# Patient Record
Sex: FEMALE | Race: WHITE | Employment: FULL TIME | URBAN - METROPOLITAN AREA
[De-identification: names, ages, dates, MRNs, and addresses within clinical notes are randomized per-mention and may not be internally consistent; named-entity substitution may affect disease eponyms.]

---

## 2017-09-22 ENCOUNTER — HOSPITAL ENCOUNTER (OUTPATIENT)
Dept: VASCULAR LAB | Age: 57
Discharge: HOME | End: 2017-09-22
Attending: INTERNAL MEDICINE | Admitting: INTERNAL MEDICINE

## 2017-09-22 DIAGNOSIS — M79.662 PAIN OF LEFT LOWER LEG: ICD-10-CM

## 2017-10-25 ENCOUNTER — HOSPITAL ENCOUNTER (OUTPATIENT)
Dept: MAMMOGRAPHY | Age: 57
Discharge: HOME | End: 2017-10-25
Attending: OBSTETRICS & GYNECOLOGY | Admitting: OBSTETRICS & GYNECOLOGY

## 2017-10-25 DIAGNOSIS — Z12.31 VISIT FOR SCREENING MAMMOGRAM: ICD-10-CM

## 2018-12-28 ENCOUNTER — HOSPITAL ENCOUNTER (OUTPATIENT)
Dept: MAMMOGRAPHY | Age: 58
Discharge: HOME | End: 2018-12-28
Payer: COMMERCIAL

## 2018-12-28 DIAGNOSIS — Z12.31 VISIT FOR SCREENING MAMMOGRAM: ICD-10-CM

## 2018-12-28 PROCEDURE — 77063 BREAST TOMOSYNTHESIS BI: CPT

## 2020-06-05 ENCOUNTER — HOSPITAL ENCOUNTER (OUTPATIENT)
Dept: MAMMOGRAPHY | Age: 60
Discharge: HOME | End: 2020-06-05
Payer: COMMERCIAL

## 2020-06-05 PROCEDURE — 77063 BREAST TOMOSYNTHESIS BI: CPT

## 2021-02-05 ENCOUNTER — APPOINTMENT (OUTPATIENT)
Dept: CT IMAGING | Age: 61
DRG: 176 | End: 2021-02-05
Payer: COMMERCIAL

## 2021-02-05 ENCOUNTER — APPOINTMENT (OUTPATIENT)
Dept: GENERAL RADIOLOGY | Age: 61
DRG: 176 | End: 2021-02-05
Payer: COMMERCIAL

## 2021-02-05 ENCOUNTER — HOSPITAL ENCOUNTER (INPATIENT)
Age: 61
LOS: 3 days | Discharge: HOME | DRG: 176 | End: 2021-02-08
Attending: EMERGENCY MEDICINE | Admitting: INTERNAL MEDICINE
Payer: COMMERCIAL

## 2021-02-05 DIAGNOSIS — I26.99 OTHER ACUTE PULMONARY EMBOLISM WITHOUT ACUTE COR PULMONALE (HCC): Primary | ICD-10-CM

## 2021-02-05 LAB
ANION GAP SERPL CALCULATED.3IONS-SCNC: 12 MMOL/L (ref 3–16)
BASOPHILS ABSOLUTE: 0.1 K/UL (ref 0–0.2)
BASOPHILS RELATIVE PERCENT: 0.5 %
BUN BLDV-MCNC: 10 MG/DL (ref 7–20)
CALCIUM SERPL-MCNC: 9.3 MG/DL (ref 8.3–10.6)
CHLORIDE BLD-SCNC: 97 MMOL/L (ref 99–110)
CO2: 23 MMOL/L (ref 21–32)
CREAT SERPL-MCNC: <0.5 MG/DL (ref 0.6–1.2)
D DIMER: 981 NG/ML DDU (ref 0–229)
EKG ATRIAL RATE: 92 BPM
EKG DIAGNOSIS: NORMAL
EKG P AXIS: -1 DEGREES
EKG P-R INTERVAL: 134 MS
EKG Q-T INTERVAL: 366 MS
EKG QRS DURATION: 80 MS
EKG QTC CALCULATION (BAZETT): 452 MS
EKG R AXIS: 12 DEGREES
EKG T AXIS: 45 DEGREES
EKG VENTRICULAR RATE: 92 BPM
EOSINOPHILS ABSOLUTE: 0.3 K/UL (ref 0–0.6)
EOSINOPHILS RELATIVE PERCENT: 2.8 %
GFR AFRICAN AMERICAN: >60
GFR NON-AFRICAN AMERICAN: >60
GLUCOSE BLD-MCNC: 255 MG/DL (ref 70–99)
HCT VFR BLD CALC: 46.6 % (ref 36–48)
HEMOGLOBIN: 15.9 G/DL (ref 12–16)
LYMPHOCYTES ABSOLUTE: 2.1 K/UL (ref 1–5.1)
LYMPHOCYTES RELATIVE PERCENT: 20.5 %
MCH RBC QN AUTO: 30.2 PG (ref 26–34)
MCHC RBC AUTO-ENTMCNC: 34.1 G/DL (ref 31–36)
MCV RBC AUTO: 88.4 FL (ref 80–100)
MONOCYTES ABSOLUTE: 0.6 K/UL (ref 0–1.3)
MONOCYTES RELATIVE PERCENT: 5.9 %
NEUTROPHILS ABSOLUTE: 7.3 K/UL (ref 1.7–7.7)
NEUTROPHILS RELATIVE PERCENT: 70.3 %
PDW BLD-RTO: 13.4 % (ref 12.4–15.4)
PLATELET # BLD: 230 K/UL (ref 135–450)
PMV BLD AUTO: 8.3 FL (ref 5–10.5)
POTASSIUM REFLEX MAGNESIUM: 3.7 MMOL/L (ref 3.5–5.1)
PRO-BNP: 74 PG/ML (ref 0–124)
RBC # BLD: 5.27 M/UL (ref 4–5.2)
SODIUM BLD-SCNC: 132 MMOL/L (ref 136–145)
TROPONIN: <0.01 NG/ML
WBC # BLD: 10.4 K/UL (ref 4–11)

## 2021-02-05 PROCEDURE — 85520 HEPARIN ASSAY: CPT

## 2021-02-05 PROCEDURE — 85025 COMPLETE CBC W/AUTO DIFF WBC: CPT

## 2021-02-05 PROCEDURE — 85379 FIBRIN DEGRADATION QUANT: CPT

## 2021-02-05 PROCEDURE — 93005 ELECTROCARDIOGRAM TRACING: CPT | Performed by: STUDENT IN AN ORGANIZED HEALTH CARE EDUCATION/TRAINING PROGRAM

## 2021-02-05 PROCEDURE — 1200000000 HC SEMI PRIVATE

## 2021-02-05 PROCEDURE — 6370000000 HC RX 637 (ALT 250 FOR IP): Performed by: STUDENT IN AN ORGANIZED HEALTH CARE EDUCATION/TRAINING PROGRAM

## 2021-02-05 PROCEDURE — 6360000002 HC RX W HCPCS: Performed by: STUDENT IN AN ORGANIZED HEALTH CARE EDUCATION/TRAINING PROGRAM

## 2021-02-05 PROCEDURE — 99285 EMERGENCY DEPT VISIT HI MDM: CPT

## 2021-02-05 PROCEDURE — 71045 X-RAY EXAM CHEST 1 VIEW: CPT

## 2021-02-05 PROCEDURE — 85730 THROMBOPLASTIN TIME PARTIAL: CPT

## 2021-02-05 PROCEDURE — 6360000004 HC RX CONTRAST MEDICATION: Performed by: STUDENT IN AN ORGANIZED HEALTH CARE EDUCATION/TRAINING PROGRAM

## 2021-02-05 PROCEDURE — 96375 TX/PRO/DX INJ NEW DRUG ADDON: CPT

## 2021-02-05 PROCEDURE — 83880 ASSAY OF NATRIURETIC PEPTIDE: CPT

## 2021-02-05 PROCEDURE — 96374 THER/PROPH/DIAG INJ IV PUSH: CPT

## 2021-02-05 PROCEDURE — 71260 CT THORAX DX C+: CPT

## 2021-02-05 PROCEDURE — 80048 BASIC METABOLIC PNL TOTAL CA: CPT

## 2021-02-05 PROCEDURE — 84484 ASSAY OF TROPONIN QUANT: CPT

## 2021-02-05 PROCEDURE — 36415 COLL VENOUS BLD VENIPUNCTURE: CPT

## 2021-02-05 RX ORDER — INSULIN LISPRO 100 [IU]/ML
0-3 INJECTION, SOLUTION INTRAVENOUS; SUBCUTANEOUS NIGHTLY
Status: DISCONTINUED | OUTPATIENT
Start: 2021-02-05 | End: 2021-02-06

## 2021-02-05 RX ORDER — NICOTINE POLACRILEX 4 MG
15 LOZENGE BUCCAL PRN
Status: DISCONTINUED | OUTPATIENT
Start: 2021-02-05 | End: 2021-02-08 | Stop reason: HOSPADM

## 2021-02-05 RX ORDER — POLYETHYLENE GLYCOL 3350 17 G/17G
17 POWDER, FOR SOLUTION ORAL DAILY PRN
Status: DISCONTINUED | OUTPATIENT
Start: 2021-02-05 | End: 2021-02-08 | Stop reason: HOSPADM

## 2021-02-05 RX ORDER — ACETAMINOPHEN 650 MG/1
650 SUPPOSITORY RECTAL EVERY 6 HOURS PRN
Status: DISCONTINUED | OUTPATIENT
Start: 2021-02-05 | End: 2021-02-08 | Stop reason: HOSPADM

## 2021-02-05 RX ORDER — ACETAMINOPHEN 325 MG/1
650 TABLET ORAL EVERY 6 HOURS PRN
Status: DISCONTINUED | OUTPATIENT
Start: 2021-02-05 | End: 2021-02-08 | Stop reason: HOSPADM

## 2021-02-05 RX ORDER — DIPHENHYDRAMINE HYDROCHLORIDE 50 MG/ML
25 INJECTION INTRAMUSCULAR; INTRAVENOUS ONCE
Status: COMPLETED | OUTPATIENT
Start: 2021-02-05 | End: 2021-02-05

## 2021-02-05 RX ORDER — DULOXETIN HYDROCHLORIDE 30 MG/1
30 CAPSULE, DELAYED RELEASE ORAL DAILY
Status: DISCONTINUED | OUTPATIENT
Start: 2021-02-06 | End: 2021-02-08 | Stop reason: HOSPADM

## 2021-02-05 RX ORDER — HEPARIN SODIUM 1000 [USP'U]/ML
80 INJECTION, SOLUTION INTRAVENOUS; SUBCUTANEOUS PRN
Status: DISCONTINUED | OUTPATIENT
Start: 2021-02-05 | End: 2021-02-07

## 2021-02-05 RX ORDER — IBUPROFEN 400 MG/1
800 TABLET ORAL ONCE
Status: COMPLETED | OUTPATIENT
Start: 2021-02-05 | End: 2021-02-05

## 2021-02-05 RX ORDER — PROMETHAZINE HYDROCHLORIDE 12.5 MG/1
12.5 TABLET ORAL EVERY 6 HOURS PRN
Status: DISCONTINUED | OUTPATIENT
Start: 2021-02-05 | End: 2021-02-08 | Stop reason: HOSPADM

## 2021-02-05 RX ORDER — DEXTROSE MONOHYDRATE 25 G/50ML
12.5 INJECTION, SOLUTION INTRAVENOUS PRN
Status: DISCONTINUED | OUTPATIENT
Start: 2021-02-05 | End: 2021-02-08 | Stop reason: HOSPADM

## 2021-02-05 RX ORDER — SODIUM CHLORIDE 0.9 % (FLUSH) 0.9 %
10 SYRINGE (ML) INJECTION PRN
Status: DISCONTINUED | OUTPATIENT
Start: 2021-02-05 | End: 2021-02-08 | Stop reason: HOSPADM

## 2021-02-05 RX ORDER — HEPARIN SODIUM 10000 [USP'U]/100ML
12 INJECTION, SOLUTION INTRAVENOUS CONTINUOUS
Status: DISCONTINUED | OUTPATIENT
Start: 2021-02-05 | End: 2021-02-07

## 2021-02-05 RX ORDER — ONDANSETRON 2 MG/ML
4 INJECTION INTRAMUSCULAR; INTRAVENOUS EVERY 6 HOURS PRN
Status: DISCONTINUED | OUTPATIENT
Start: 2021-02-05 | End: 2021-02-08 | Stop reason: HOSPADM

## 2021-02-05 RX ORDER — SODIUM CHLORIDE 0.9 % (FLUSH) 0.9 %
10 SYRINGE (ML) INJECTION EVERY 12 HOURS SCHEDULED
Status: DISCONTINUED | OUTPATIENT
Start: 2021-02-05 | End: 2021-02-08 | Stop reason: HOSPADM

## 2021-02-05 RX ORDER — INSULIN LISPRO 100 [IU]/ML
0-6 INJECTION, SOLUTION INTRAVENOUS; SUBCUTANEOUS
Status: DISCONTINUED | OUTPATIENT
Start: 2021-02-06 | End: 2021-02-06

## 2021-02-05 RX ORDER — DIAZEPAM 5 MG/1
5 TABLET ORAL ONCE
Status: COMPLETED | OUTPATIENT
Start: 2021-02-05 | End: 2021-02-05

## 2021-02-05 RX ORDER — HEPARIN SODIUM 1000 [USP'U]/ML
40 INJECTION, SOLUTION INTRAVENOUS; SUBCUTANEOUS PRN
Status: DISCONTINUED | OUTPATIENT
Start: 2021-02-05 | End: 2021-02-07

## 2021-02-05 RX ORDER — METHYLPREDNISOLONE SODIUM SUCCINATE 125 MG/2ML
60 INJECTION, POWDER, LYOPHILIZED, FOR SOLUTION INTRAMUSCULAR; INTRAVENOUS ONCE
Status: COMPLETED | OUTPATIENT
Start: 2021-02-05 | End: 2021-02-05

## 2021-02-05 RX ORDER — HEPARIN SODIUM 1000 [USP'U]/ML
80 INJECTION, SOLUTION INTRAVENOUS; SUBCUTANEOUS ONCE
Status: COMPLETED | OUTPATIENT
Start: 2021-02-05 | End: 2021-02-05

## 2021-02-05 RX ORDER — DEXTROSE MONOHYDRATE 50 MG/ML
100 INJECTION, SOLUTION INTRAVENOUS PRN
Status: DISCONTINUED | OUTPATIENT
Start: 2021-02-05 | End: 2021-02-08 | Stop reason: HOSPADM

## 2021-02-05 RX ADMIN — IBUPROFEN 800 MG: 400 TABLET, FILM COATED ORAL at 18:16

## 2021-02-05 RX ADMIN — DIPHENHYDRAMINE HYDROCHLORIDE 25 MG: 50 INJECTION INTRAMUSCULAR; INTRAVENOUS at 18:52

## 2021-02-05 RX ADMIN — HEPARIN SODIUM 18 UNITS/KG/HR: 10000 INJECTION, SOLUTION INTRAVENOUS at 21:09

## 2021-02-05 RX ADMIN — METHYLPREDNISOLONE SODIUM SUCCINATE 60 MG: 125 INJECTION, POWDER, FOR SOLUTION INTRAMUSCULAR; INTRAVENOUS at 18:16

## 2021-02-05 RX ADMIN — DIAZEPAM 5 MG: 5 TABLET ORAL at 18:16

## 2021-02-05 RX ADMIN — HEPARIN SODIUM 8530 UNITS: 1000 INJECTION INTRAVENOUS; SUBCUTANEOUS at 21:08

## 2021-02-05 RX ADMIN — IOPAMIDOL 80 ML: 755 INJECTION, SOLUTION INTRAVENOUS at 19:51

## 2021-02-05 ASSESSMENT — ENCOUNTER SYMPTOMS
ABDOMINAL PAIN: 0
CHEST TIGHTNESS: 1
CONSTIPATION: 0
ALLERGIC/IMMUNOLOGIC NEGATIVE: 1
SHORTNESS OF BREATH: 1
EYES NEGATIVE: 1
VOMITING: 0
DIARRHEA: 0
NAUSEA: 0
COUGH: 0
GASTROINTESTINAL NEGATIVE: 1
WHEEZING: 0

## 2021-02-05 ASSESSMENT — PAIN DESCRIPTION - LOCATION: LOCATION: LEG

## 2021-02-05 ASSESSMENT — PAIN SCALES - GENERAL: PAINLEVEL_OUTOF10: 5

## 2021-02-05 ASSESSMENT — PAIN DESCRIPTION - ORIENTATION: ORIENTATION: RIGHT;LOWER

## 2021-02-05 NOTE — DISCHARGE INSTR - COC
Continuity of Care Form    Patient Name: Lana Marino   :  1960  MRN:  [id number]    Admit date:  2021  Discharge date:  ***    Code Status Order: No Order   Advance Directives:     Admitting Physician:  No admitting provider for patient encounter. PCP: Chadd Kunz    Discharging Nurse: [facility] Unit/Room#: D09/F22-21  Discharging Unit Phone Number: ***    Emergency Contact:   Extended Emergency Contact Information  Primary Emergency Contact: Josi Schneider  Address: [address] [place] [address], [address] [place] [address] Phone: [phone]  Work Phone: [phone]  Relation: Spouse  Secondary Emergency Contact: Josi Schneider  Address: [address] [place] [address], [address] [place] [address] Phone: [phone]  Work Phone: [phone]  Relation: Spouse    Past Surgical History:  History reviewed. No pertinent surgical history. Immunization History: There is no immunization history on file for this patient. Active Problems: There is no problem list on file for this patient. Isolation/Infection:   Isolation          No Isolation        Unreconciled Outside Infections     Enable clinical decision support by reconciling outside information with the patient's chart.     .    Infection Onset Last Indicated Last Received Source    COVID-19 [facility]      Patient Infection Status     None to display          Nurse Assessment:  Last Vital Signs: BP (!) 138/99   Pulse 92   Temp 98 °F (36.7 °C) (Oral)   Resp 20   Ht 5' 9\" (1.753 m)   Wt 235 lb (106.6 kg)   SpO2 98%   BMI 34.70 kg/m²     Last documented pain score (0-10 scale): Pain Level: 5  Last Weight:   Wt Readings from Last 1 Encounters:   21 235 lb (106.6 kg)     Mental Status:  {IP PT MENTAL STATUS:}    IV Access:  { MARGARET IV ACCESS:922330957}    Nursing Mobility/ADLs:  Walking   {P DME GFHY:308113370}  Transfer  {P DME NPNZ:552753747}  Bathing  {CHP DME FEAI:237287093}  Dressing  {CHP DME SZAT:844877907}  Toileting  {CHP DME LKQK:403997949}  Feeding  {CHP DME ECMX:987627924}  Med Admin  {CHP DME VOUT:010212512}  Med Delivery   { MARGARET MED Delivery:617920967}    Wound Care Documentation and Therapy:        Elimination:  Continence:   · Bowel: {YES / WV:90495}  · Bladder: {YES / DO:43430}  Urinary Catheter: {Urinary Catheter:276437621}   Colostomy/Ileostomy/Ileal Conduit: {YES / VS:75533}       Date of Last BM: ***  No intake or output data in the 24 hours ending 21 1705  No intake/output data recorded.     Safety Concerns:     [address] [employer] Safety Concerns:045112930}    Impairments/Disabilities:      [address] [employer] Impairments/Disabilities:324879356}    Nutrition Therapy:  Current Nutrition Therapy:   [address] [employer] Diet List:945699271}    Routes of Feeding: {CHP DME Other Feedings:267452745}  Liquids: {Slp liquid thickness:39327}  Daily Fluid Restriction: {CHP DME Yes amt example:732478452}  Last Modified Barium Swallow with Video (Video Swallowing Test): {Done Not Done [facility]:650709025}    Treatments at the Time of Hospital Discharge:   Respiratory Treatments: ***  Oxygen Therapy:  {Therapy; copd oxygen:27000}  Ventilator:    { CC Vent YBVO:826708019}    Rehab Therapies: {THERAPEUTIC INTERVENTION:0722784899}  Weight Bearing Status/Restrictions: [address] [employer] Weight Bearin}  Other Medical Equipment (for information only, NOT a DME order):  {EQUIPMENT:011212152}  Other Treatments: ***    Patient's personal belongings (please select all that are sent with patient):  {CHP DME Belongings:132914531}    RN SIGNATURE:  {Esignature:110816592}    CASE MANAGEMENT/SOCIAL WORK SECTION    Inpatient Status Date: ***    Readmission Risk Assessment Score:  Readmission Risk              Risk of Unplanned Readmission:        0           Discharging to Facility/ Agency   · Name:   · Address:  · Phone:  · Fax:    Dialysis Facility (if applicable) · Name:  · Address:  · Dialysis Schedule:  · Phone:  · Fax:    / signature: {Esignature:410356099}    PHYSICIAN SECTION    Prognosis: {Prognosis:8872624858}    Condition at Discharge: Brent Carvalho Patient Condition:634518426}    Rehab Potential (if transferring to Rehab): {Prognosis:2809535893}    Recommended Labs or Other Treatments After Discharge: ***    Physician Certification: I certify the above information and transfer of Myla Olivas  is necessary for the continuing treatment of the diagnosis listed and that she requires {Admit to Appropriate Level of Care:04067} for {GREATER/LESS:798542574} 30 days.      Update Admission H&P: {CHP DME Changes in XSGOS:171412029}    PHYSICIAN SIGNATURE:  {Esignature:839919249}

## 2021-02-05 NOTE — ED TRIAGE NOTES
Pt arrived to ED with pain in right lower leg for 3 days and worsening shortness of breath for the past couple days. PCP concerned for blood clot. Recent COVID 1/9/2021.

## 2021-02-05 NOTE — ED PROVIDER NOTES
1 [facility]  EMERGENCY DEPARTMENT ENCOUNTER          [facility] RESIDENT NOTE       Date of evaluation: 2/5/2021    Chief Complaint     Leg Pain (recent COVID 1/9/2021. rule out blood clot) and Shortness of Breath      History of Present Illness     Loki Damon is a 63 y.o. female with past medical history of psoriatic arthritis, hypertension, fibromyalgia, previous Covid at the beginning of January, low IgG, who presents with pain of the right calf for 3 to 4 days. Reports having some pain in the right calf behind the knee that radiates up to her hip on the right side. It is intermittent in nature and goes anywhere from a 2 out of 10 to a 9 out of 10. It is a sharp pain and especially painful behind her knee. She reports swelling on her right calf. Patient is not on any blood thinners. Of note patient has also endorsed some shortness of breath fatigue and muscle aches over 3 to 4 days ago. She feels like these are residual Covid symptoms. She denies any chest pain and does not have any chest tenderness with touch. Patient is satting in the high 90s on room air. Review of Systems     Review of Systems   Constitutional: Positive for fatigue. HENT: Negative. Eyes: Negative. Respiratory: Positive for shortness of breath. Cardiovascular: Positive for leg swelling. Gastrointestinal: Negative. Endocrine: Negative. Genitourinary: Negative. Musculoskeletal: Positive for myalgias. Skin: Negative. Allergic/Immunologic: Negative. Neurological: Negative. Hematological: Negative. Psychiatric/Behavioral: Negative. Past Medical, Surgical, Family, and Social History     She has no past medical history on file. She has no past surgical history on file. Her family history is not on file. She reports that she has quit smoking. She has never used smokeless tobacco. She reports current alcohol use of about 14.0 standard drinks of alcohol per week.  She reports that she does not use drugs. Medications     Previous Medications    No medications on file       Allergies     She is allergic to latex; iodides; topiramate; morphine and related; penicillins; phenazopyridine; and sulfa antibiotics. Physical Exam     INITIAL VITALS: BP: (!) 138/99, Temp: 98 °F (36.7 °C), Pulse: 92, Resp: 20, SpO2: 98 %   Physical Exam  Constitutional:       Appearance: She is well-developed. HENT:      Head: Normocephalic and atraumatic. Eyes:      Pupils: Pupils are equal, round, and reactive to light. Neck:      Musculoskeletal: Normal range of motion. Cardiovascular:      Rate and Rhythm: Normal rate and regular rhythm. Pulmonary:      Effort: Pulmonary effort is normal.      Breath sounds: Normal breath sounds. Musculoskeletal: Normal range of motion. Neurological:      General: No focal deficit present. Mental Status: She is alert and oriented to person, place, and time. Diagnostic Results     EKG   Interpreted inconjunction with emergency department physician Lazaro Robles MD  Rhythm: normal sinus   Rate: normal  Axis: normal  Ectopy: none  Conduction: normal  ST Segments: no acute change  T Waves: no acute change  Q Waves: none  Clinical Impression: no acute changes  Comparison:      RADIOLOGY:  CT CHEST PULMONARY EMBOLISM W CONTRAST   Final Result      1. Bilateral pulmonary thromboembolus, in the branch vessels proximal mid and distal without signs of any pleural effusion, pulmonary infarct or right heart strain at this time. No parenchymal consolidation      2. No sign of any aortic aneurysm or dissection. Fatty liver and hepatic cystic change noted      Results called to the emergency room resident physician taking care of this patient at 2025 hours      XR CHEST PORTABLE   Final Result      1. No acute process or consolidation   2.  Stable                   LABS:   Results for orders placed or performed during the hospital encounter of 02/05/21   CBC Auto Differential   Result Value Ref Range    WBC 10.4 4.0 - 11.0 K/uL    RBC 5.27 (H) 4.00 - 5.20 M/uL    Hemoglobin 15.9 12.0 - 16.0 g/dL    Hematocrit 46.6 36.0 - 48.0 %    MCV 88.4 80.0 - 100.0 fL    MCH 30.2 26.0 - 34.0 pg    MCHC 34.1 31.0 - 36.0 g/dL    RDW 13.4 12.4 - 15.4 %    Platelets 860 803 - 471 K/uL    MPV 8.3 5.0 - 10.5 fL    Neutrophils % 70.3 %    Lymphocytes % 20.5 %    Monocytes % 5.9 %    Eosinophils % 2.8 %    Basophils % 0.5 %    Neutrophils Absolute 7.3 1.7 - 7.7 K/uL    Lymphocytes Absolute 2.1 1.0 - 5.1 K/uL    Monocytes Absolute 0.6 0.0 - 1.3 K/uL    Eosinophils Absolute 0.3 0.0 - 0.6 K/uL    Basophils Absolute 0.1 0.0 - 0.2 K/uL   Troponin   Result Value Ref Range    Troponin <0.01 <0.01 ng/mL   Basic Metabolic Panel w/ Reflex to MG   Result Value Ref Range    Sodium 132 (L) 136 - 145 mmol/L    Potassium reflex Magnesium 3.7 3.5 - 5.1 mmol/L    Chloride 97 (L) 99 - 110 mmol/L    CO2 23 21 - 32 mmol/L    Anion Gap 12 3 - 16    Glucose 255 (H) 70 - 99 mg/dL    BUN 10 7 - 20 mg/dL    CREATININE <0.5 (L) 0.6 - 1.2 mg/dL    GFR Non-African American >60 >60    GFR African American >60 >60    Calcium 9.3 8.3 - 10.6 mg/dL   D-Dimer, Quantitative   Result Value Ref Range    D-Dimer, Quant 981 (H) 0 - 229 ng/mL DDU   Brain Natriuretic Peptide   Result Value Ref Range    Pro-BNP 74 0 - 124 pg/mL   EKG 12 Lead   Result Value Ref Range    Ventricular Rate 92 BPM    Atrial Rate 92 BPM    P-R Interval 134 ms    QRS Duration 80 ms    Q-T Interval 366 ms    QTc Calculation (Bazett) 452 ms    P Axis -1 degrees    R Axis 12 degrees    T Axis 45 degrees    Diagnosis       EKG performed in ER and to be interpreted by ER physician. Confirmed by MD, ER ([id number]),  Ishmael Last ([phone]) on 2/5/2021 7:28:23 PM       ED BEDSIDE ULTRASOUND:      RECENT VITALS:  BP: (!) 147/91, Temp: 98 °F (36.7 °C),Pulse: 87, Resp: 18, SpO2: 97 %     Procedures         ED Course     Nursing Notes, Past Medical Hx, Past Surgical Hx, Social Hx, Allergies, and FamilyHx were reviewed. The patient was giventhe following medications:  Orders Placed This Encounter   Medications    diazePAM (VALIUM) tablet 5 mg    ibuprofen (ADVIL;MOTRIN) tablet 800 mg    methylPREDNISolone sodium (SOLU-MEDROL) injection 60 mg    diphenhydrAMINE (BENADRYL) injection 25 mg    iopamidol (ISOVUE-370) 76 % injection 80 mL    heparin (porcine) injection 8,530 Units    heparin (porcine) injection 8,530 Units    heparin (porcine) injection 4,260 Units    heparin 25,000 units in dextrose 5% 250 mL (premix) infusion       CONSULTS:  IP CONSULT TO HOSPITALIST    MEDICAL DECISION MAKING / ASSESSMENT / Rafael Barriga is a 63 y.o. female with past medical history of psoriatic arthritis, hypertension, fibromyalgia, previous Covid at the beginning of January, low IgG, who presents with pain of the right calf for 3 to 4 days. We did a broad lab work-up. Including BNP, CBC, troponin, BNP, D-dimer. Troponin was negative BNP BNP and CBC are unremarkable. Patient's D-dimer resulted at 980 and with combined shortness of breath we felt it was necessary to get a CTPA to exclude a PE.  CTPA results of bilateral PEs with no visible R heart strain. Patient was started on heparin drip and hospitalist was consulted to admit the patient. Patient was discussed with hospitalist she is agreeable to admit patient to hospital.  Patient was informed and agreeable to plan. This patient was also evaluated by the attending physician. All care plans were discussed and agreed upon. Clinical Impression     1. Other acute pulmonary embolism without acute cor pulmonale (HCC)        Disposition     PATIENT REFERRED TO:  No follow-up provider specified.     DISCHARGE MEDICATIONS:  New Prescriptions    No medications on file       DISPOSITION Admitted 02/05/2021 09:04:52 PM     Nika Moralez MD  Resident  02/05/21 3388

## 2021-02-05 NOTE — ED PROVIDER NOTES
ED Attending Attestation Note     Date of evaluation: 2/5/2021    This patient was seen by the resident. I have seen and examined the patient, agree with the workup, evaluation, management and diagnosis. The care plan has been discussed. I have reviewed the ECG and concur with the resident's interpretation. My assessment reveals a 63year old female with a past medical history of HTN, fibromyalgia, CARDENAS and diabetes with COVDI-19 on 1/8/21 who presented to the ED with right calf pain. She has also been experiencing worsening shortness of breath. On my evaluation she is well appearing with a regular rate and rhythm and clear lungs. On my evaluation she is tender to palpation of her right calf. No erythema, warmth, or swelling. 5/5 PF/DF/EHL. SILT. +2 DP pulse. She has full range of motion of her right lower extremity.         Marya Osullivan MD  02/05/21 5724

## 2021-02-06 LAB
ANION GAP SERPL CALCULATED.3IONS-SCNC: 12 MMOL/L (ref 3–16)
ANTI-XA UNFRAC HEPARIN: 0.51 IU/ML (ref 0.3–0.7)
ANTI-XA UNFRAC HEPARIN: 1.09 IU/ML (ref 0.3–0.7)
ANTI-XA UNFRAC HEPARIN: >1.8 IU/ML (ref 0.3–0.7)
APTT: >248 SEC (ref 24.2–36.2)
BASOPHILS ABSOLUTE: 0 K/UL (ref 0–0.2)
BASOPHILS RELATIVE PERCENT: 0.3 %
BUN BLDV-MCNC: 15 MG/DL (ref 7–20)
CALCIUM SERPL-MCNC: 9.5 MG/DL (ref 8.3–10.6)
CHLORIDE BLD-SCNC: 103 MMOL/L (ref 99–110)
CO2: 23 MMOL/L (ref 21–32)
CREAT SERPL-MCNC: 0.6 MG/DL (ref 0.6–1.2)
EOSINOPHILS ABSOLUTE: 0 K/UL (ref 0–0.6)
EOSINOPHILS RELATIVE PERCENT: 0.1 %
GFR AFRICAN AMERICAN: >60
GFR NON-AFRICAN AMERICAN: >60
GLUCOSE BLD-MCNC: 255 MG/DL (ref 70–99)
GLUCOSE BLD-MCNC: 265 MG/DL (ref 70–99)
GLUCOSE BLD-MCNC: 289 MG/DL (ref 70–99)
GLUCOSE BLD-MCNC: 318 MG/DL (ref 70–99)
GLUCOSE BLD-MCNC: 322 MG/DL (ref 70–99)
GLUCOSE BLD-MCNC: 433 MG/DL (ref 70–99)
GLUCOSE BLD-MCNC: 468 MG/DL (ref 70–99)
HCT VFR BLD CALC: 45.1 % (ref 36–48)
HEMOGLOBIN: 15.5 G/DL (ref 12–16)
LYMPHOCYTES ABSOLUTE: 1.4 K/UL (ref 1–5.1)
LYMPHOCYTES RELATIVE PERCENT: 10.8 %
MCH RBC QN AUTO: 30 PG (ref 26–34)
MCHC RBC AUTO-ENTMCNC: 34.3 G/DL (ref 31–36)
MCV RBC AUTO: 87.4 FL (ref 80–100)
MONOCYTES ABSOLUTE: 0.5 K/UL (ref 0–1.3)
MONOCYTES RELATIVE PERCENT: 3.5 %
NEUTROPHILS ABSOLUTE: 11 K/UL (ref 1.7–7.7)
NEUTROPHILS RELATIVE PERCENT: 85.3 %
PDW BLD-RTO: 13 % (ref 12.4–15.4)
PERFORMED ON: ABNORMAL
PLATELET # BLD: 270 K/UL (ref 135–450)
PMV BLD AUTO: 8 FL (ref 5–10.5)
POTASSIUM REFLEX MAGNESIUM: 3.9 MMOL/L (ref 3.5–5.1)
RBC # BLD: 5.16 M/UL (ref 4–5.2)
SODIUM BLD-SCNC: 138 MMOL/L (ref 136–145)
WBC # BLD: 12.9 K/UL (ref 4–11)

## 2021-02-06 PROCEDURE — 2060000000 HC ICU INTERMEDIATE R&B

## 2021-02-06 PROCEDURE — 80048 BASIC METABOLIC PNL TOTAL CA: CPT

## 2021-02-06 PROCEDURE — 6360000002 HC RX W HCPCS: Performed by: STUDENT IN AN ORGANIZED HEALTH CARE EDUCATION/TRAINING PROGRAM

## 2021-02-06 PROCEDURE — 36415 COLL VENOUS BLD VENIPUNCTURE: CPT

## 2021-02-06 PROCEDURE — 6370000000 HC RX 637 (ALT 250 FOR IP): Performed by: STUDENT IN AN ORGANIZED HEALTH CARE EDUCATION/TRAINING PROGRAM

## 2021-02-06 PROCEDURE — 6370000000 HC RX 637 (ALT 250 FOR IP): Performed by: INTERNAL MEDICINE

## 2021-02-06 PROCEDURE — 85025 COMPLETE CBC W/AUTO DIFF WBC: CPT

## 2021-02-06 PROCEDURE — 85520 HEPARIN ASSAY: CPT

## 2021-02-06 PROCEDURE — 2580000003 HC RX 258: Performed by: STUDENT IN AN ORGANIZED HEALTH CARE EDUCATION/TRAINING PROGRAM

## 2021-02-06 RX ORDER — TRIAMTERENE AND HYDROCHLOROTHIAZIDE 37.5; 25 MG/1; MG/1
1 TABLET ORAL DAILY
COMMUNITY

## 2021-02-06 RX ORDER — LABETALOL HYDROCHLORIDE 5 MG/ML
10 INJECTION, SOLUTION INTRAVENOUS EVERY 6 HOURS PRN
Status: DISCONTINUED | OUTPATIENT
Start: 2021-02-06 | End: 2021-02-08 | Stop reason: HOSPADM

## 2021-02-06 RX ORDER — INSULIN LISPRO 100 [IU]/ML
0-12 INJECTION, SOLUTION INTRAVENOUS; SUBCUTANEOUS
Status: DISCONTINUED | OUTPATIENT
Start: 2021-02-06 | End: 2021-02-07

## 2021-02-06 RX ORDER — DULOXETIN HYDROCHLORIDE 30 MG/1
30 CAPSULE, DELAYED RELEASE ORAL DAILY
COMMUNITY
Start: 2020-03-26

## 2021-02-06 RX ORDER — ACYCLOVIR 800 MG/1
800 TABLET ORAL
Status: DISCONTINUED | OUTPATIENT
Start: 2021-02-06 | End: 2021-02-08 | Stop reason: HOSPADM

## 2021-02-06 RX ORDER — INSULIN LISPRO 100 [IU]/ML
0-6 INJECTION, SOLUTION INTRAVENOUS; SUBCUTANEOUS NIGHTLY
Status: DISCONTINUED | OUTPATIENT
Start: 2021-02-06 | End: 2021-02-07

## 2021-02-06 RX ORDER — INSULIN LISPRO 100 [IU]/ML
0-12 INJECTION, SOLUTION INTRAVENOUS; SUBCUTANEOUS
Status: DISCONTINUED | OUTPATIENT
Start: 2021-02-06 | End: 2021-02-06

## 2021-02-06 RX ADMIN — INSULIN LISPRO 12 UNITS: 100 INJECTION, SOLUTION INTRAVENOUS; SUBCUTANEOUS at 06:14

## 2021-02-06 RX ADMIN — ACETAMINOPHEN 650 MG: 325 TABLET ORAL at 17:50

## 2021-02-06 RX ADMIN — INSULIN GLARGINE 21 UNITS: 100 INJECTION, SOLUTION SUBCUTANEOUS at 21:22

## 2021-02-06 RX ADMIN — Medication 10 ML: at 10:12

## 2021-02-06 RX ADMIN — INSULIN LISPRO 6 UNITS: 100 INJECTION, SOLUTION INTRAVENOUS; SUBCUTANEOUS at 17:39

## 2021-02-06 RX ADMIN — DULOXETINE HYDROCHLORIDE 30 MG: 30 CAPSULE, DELAYED RELEASE ORAL at 09:59

## 2021-02-06 RX ADMIN — INSULIN LISPRO 4 UNITS: 100 INJECTION, SOLUTION INTRAVENOUS; SUBCUTANEOUS at 21:23

## 2021-02-06 RX ADMIN — INSULIN HUMAN 10 UNITS: 100 INJECTION, SOLUTION PARENTERAL at 03:15

## 2021-02-06 RX ADMIN — ACETAMINOPHEN 650 MG: 325 TABLET ORAL at 10:32

## 2021-02-06 RX ADMIN — ACYCLOVIR 800 MG: 800 TABLET ORAL at 17:39

## 2021-02-06 RX ADMIN — HEPARIN SODIUM 15 UNITS/KG/HR: 10000 INJECTION, SOLUTION INTRAVENOUS at 02:08

## 2021-02-06 RX ADMIN — ACETAMINOPHEN 650 MG: 325 TABLET ORAL at 04:30

## 2021-02-06 RX ADMIN — INSULIN LISPRO 6 UNITS: 100 INJECTION, SOLUTION INTRAVENOUS; SUBCUTANEOUS at 12:12

## 2021-02-06 ASSESSMENT — PAIN DESCRIPTION - ORIENTATION: ORIENTATION: RIGHT;LOWER

## 2021-02-06 ASSESSMENT — ENCOUNTER SYMPTOMS
COUGH: 0
NAUSEA: 0
BACK PAIN: 0
SHORTNESS OF BREATH: 1
CHEST TIGHTNESS: 0
ABDOMINAL PAIN: 0
WHEEZING: 0
DIARRHEA: 0
VOMITING: 0

## 2021-02-06 ASSESSMENT — PAIN DESCRIPTION - PROGRESSION: CLINICAL_PROGRESSION: NOT CHANGED

## 2021-02-06 ASSESSMENT — PAIN SCALES - GENERAL
PAINLEVEL_OUTOF10: 3
PAINLEVEL_OUTOF10: 0
PAINLEVEL_OUTOF10: 6
PAINLEVEL_OUTOF10: 0

## 2021-02-06 ASSESSMENT — PAIN DESCRIPTION - PAIN TYPE: TYPE: ACUTE PAIN

## 2021-02-06 ASSESSMENT — PAIN DESCRIPTION - DESCRIPTORS: DESCRIPTORS: ACHING

## 2021-02-06 NOTE — H&P
Internal Medicine  PGY 1  Progress Note     CC leg pain, dyspnea    History Obtained From:  Patient    Interval History:    RIZWAN    Pt seen at bedside this morning, states is feeling \"ok\". Pt endorses continuing SOB with wheezing at rest, 4 word dyspnea and R calf tingling/numbness and pain that radiates up to R thigh. Pt denies chest pain, hemoptysis, lightheadedness or dizziness. Pt was updated on plan for continuing treatment and questions were answered. ROS  Pt endorses: subjective fevers, chronic fatigue, intermittent diarrhea x1 month  Pt Denies: chills, night sweats, nausea, vomiting       HISTORY OF PRESENT ILLNESS:    62 y/o F with PMH of recent covid infection (positive 1/9/21), DMT2, HTN, psoriatic arthritis, HTN, IgG def, depression presents with R leg pain and some dyspnea of 1 day duration. Pt reports R mid/lower calf crampy pain that radiates to her knee and up the side of her thigh. Pain improved with tylenol at home. She also noted acute LE and pleuritic chest pain that started this morning. She denies any history of previous thromboembolisms. She has a sedentary desk job. Reports being up-to-date on her cancer screenings. Recently had covid-19 infection, tested positive on 1/9/21 for whch she did not require hospitalization nor was she hypoxic. Her symptoms of sore throat and myalgias resolved about 1 week ago. In the ED, VS stable. Labwork sign for elevated d-dimer. CTPA showed acute bilateral PE w/o R heart strain. Pt started on heparin gtt and admitted for further management. Past Medical History:        Diagnosis Date    Arthritis     Depression     Diabetes mellitus (HCC)     Hypertension     IgG deficiency (HCC)     IgG deficiency - receives transfusion at home every 28 days, pt due for transfusion on 2/11.     Psoriatic arthritis ([facility] [address]     Vitamin D deficiency       recent covid infection (positive 1/9/21), DMT2, HTN, psoriatic arthritis, HTN, IgG def, depression Past Surgical History:    History reviewed. No pertinent surgical history. Medications Priorto Admission:    Medications Prior to Admission: triamterene-hydroCHLOROthiazide (MAXZIDE-25) 37.5-25 MG per tablet, Take 1 tablet by mouth daily  Apremilast (OTEZLA PO), Take by mouth  immune globulin, human, (GAMMAPLEX) 20 GM/200ML SOLN solution, Infuse 30 g intravenously every 30 days Receives every 28 days  DULoxetine (CYMBALTA) 30 MG extended release capsule, Take 30 mg by mouth daily  vitamin D3 (CHOLECALCIFEROL) 125 MCG (5000 UT) TABS tablet, Take 1 tablet by mouth daily  linaGLIPtin-metFORMIN HCl (JENTADUETO PO), Take by mouth    Allergies:  Latex, Iodides, Topiramate, Morphine and related, Penicillins, Phenazopyridine, and Sulfa antibiotics    Social History:   · TOBACCO:   reports that she has quit smoking. Her smoking use included cigarettes. She has never used smokeless tobacco.  · ETOH:   reports current alcohol use of about 14.0 standard drinks of alcohol per week. · DRUGS : denies   · Patient currently lives with family    ·   Family History:   History reviewed. No pertinent family history. ROS: A 10 point review of systems was conducted, significant findings as noted in HPI. Physical Exam  Vitals signs reviewed. Constitutional:       General: She is not in acute distress. Appearance: Normal appearance. HENT:      Head: Normocephalic and atraumatic. Comments:       Right Ear: External ear normal.      Left Ear: External ear normal.      Nose: Nose normal.      Mouth/Throat:      Mouth: Mucous membranes are moist.   Eyes:      General: No scleral icterus. Extraocular Movements: Extraocular movements intact. Neck:      Musculoskeletal: Normal range of motion and neck supple. Cardiovascular:      Rate and Rhythm: Normal rate and regular rhythm. Pulses: Normal pulses. Heart sounds: Normal heart sounds. No murmur.    Pulmonary: Effort: Pulmonary effort is normal. No respiratory distress. Breath sounds: Normal breath sounds. No wheezing. Chest:      Chest wall: No tenderness. Abdominal:      General: Bowel sounds are normal. There is no distension. Palpations: Abdomen is soft. Tenderness: There is no abdominal tenderness. Musculoskeletal:         General: Tenderness (r calf) present. No swelling. Right lower leg: No edema. Left lower leg: No edema. Comments: Posterior R calf tender to palpation   Skin:     Findings: Lesion (scalp 2/2 psoriasis) present. No rash. Neurological:      General: No focal deficit present. Mental Status: She is oriented to person, place, and time. Psychiatric:         Mood and Affect: Mood normal.         Behavior: Behavior normal.         Thought Content: Thought content normal.       Physical exam:       Vitals:    02/06/21 0928   BP: (!) 152/88   Pulse: 93   Resp: 20   Temp: 97.6 °F (36.4 °C)   SpO2: 98%       DATA:    Labs:  CBC:   Recent Labs     02/05/21  1810 02/06/21  0819   WBC 10.4 12.9*   HGB 15.9 15.5   HCT 46.6 45.1    270       BMP:   Recent Labs     02/05/21  1810 02/06/21  0819   * 138   K 3.7 3.9   CL 97* 103   CO2 23 23   BUN 10 15   CREATININE <0.5* 0.6   GLUCOSE 255* 255*     LFT's: No results for input(s): AST, ALT, ALB, BILITOT, ALKPHOS in the last 72 hours. Troponin:   Recent Labs     02/05/21 1810   TROPONINI <0.01     BNP:No results for input(s): BNP in the last 72 hours. ABGs: No results for input(s): PHART, SLE7SRV, PO2ART in the last 72 hours. INR: No results for input(s): INR in the last 72 hours. U/A:No results for input(s): NITRITE, COLORU, PHUR, LABCAST, WBCUA, RBCUA, MUCUS, TRICHOMONAS, YEAST, BACTERIA, CLARITYU, SPECGRAV, LEUKOCYTESUR, UROBILINOGEN, BILIRUBINUR, BLOODU, GLUCOSEU, AMORPHOUS in the last 72 hours.     Invalid input(s): KETONESU    CT CHEST PULMONARY EMBOLISM W CONTRAST   Final Result Vit D def - takes supplements at home     Will discuss with attending physician Dr. Ramsey Strickland Status: Full code  FEN: Gen diet  PPX: heparin gtt  DISPO: [id number] [address] PGY-1  2/6/2021,  9:30 AM     Gaetano Rhodes, MS4 acted as scribe

## 2021-02-06 NOTE — ED NOTES
Food tray provided after patient request to eat and order okayed and verified by physician. Patient in high-fowlers with bedside table in place, assisted with food arrangement and opened all containers.       Trina Chapa RN  02/05/21 2122

## 2021-02-06 NOTE — PROGRESS NOTES
Progress Note    Admit Date: 2/5/2021  Day: 1  Diet: DIET GENERAL;    CC: Shortness of breath    Interval history: No acute events overnight. Patient seen at bedside this morning. Endorses shortness of breath, is able to hold a conversation but gets short of breath within 10-20 seconds. Denies chest pain, NVD, dysuria. Endorses R calf soreness. HPI: 62 y/o F with PMH of recent covid infection (positive 1/9/21), DMT2, HTN, psoriatic arthritis, HTN, IgG def, depression presented with R calf pain for 3-4 days, acute onset SOB on exertion. Elevated D dimer, CTA showed bilateral pulmonary emboli without R heart strain. Medications:     Scheduled Meds:   insulin lispro  0-6 Units Subcutaneous Nightly    insulin lispro  0-12 Units Subcutaneous TID WC    sodium chloride flush  10 mL Intravenous 2 times per day    DULoxetine  30 mg Oral Daily     Continuous Infusions:   heparin (PORCINE) Infusion 15.009 Units/kg/hr (02/06/21 0411)    dextrose       PRN Meds:labetalol, heparin (porcine), heparin (porcine), sodium chloride flush, promethazine **OR** ondansetron, polyethylene glycol, acetaminophen **OR** acetaminophen, glucose, dextrose, glucagon (rDNA), dextrose    Objective:   Vitals:   T-max:  Patient Vitals for the past 8 hrs:   BP Temp Temp src Pulse Resp SpO2   02/06/21 0500 127/86 97.5 °F (36.4 °C) Oral 105 20 95 %       Intake/Output Summary (Last 24 hours) at 2/6/2021 0855  Last data filed at 2/6/2021 0411  Gross per 24 hour   Intake 128.48 ml   Output --   Net 128.48 ml       Review of Systems   Constitutional: Negative for chills and fever. Respiratory: Positive for shortness of breath. Negative for cough, chest tightness and wheezing. Cardiovascular: Negative for chest pain, palpitations and leg swelling. Gastrointestinal: Negative for abdominal pain, diarrhea, nausea and vomiting. Genitourinary: Negative for dysuria. Neurological: Negative for dizziness, light-headedness and numbness. Physical Exam  Vitals signs reviewed. HENT:      Nose: Nose normal.      Mouth/Throat:      Mouth: Mucous membranes are moist.   Cardiovascular:      Rate and Rhythm: Normal rate and regular rhythm. Pulses: Normal pulses. Pulmonary:      Effort: Pulmonary effort is normal. No respiratory distress. Breath sounds: Normal breath sounds. Abdominal:      General: There is no distension. Palpations: There is no mass. Skin:     General: Skin is warm. Neurological:      General: No focal deficit present. Mental Status: She is alert and oriented to person, place, and time. LABS:    CBC:   Recent Labs     02/05/21 1810   WBC 10.4   HGB 15.9   HCT 46.6      MCV 88.4     Renal:    Recent Labs     02/05/21 1810   *   K 3.7   CL 97*   CO2 23   BUN 10   CREATININE <0.5*   GLUCOSE 255*   CALCIUM 9.3   ANIONGAP 12     Hepatic: No results for input(s): AST, ALT, BILITOT, BILIDIR, PROT, LABALBU, ALKPHOS in the last 72 hours. Troponin:   Recent Labs     02/05/21 1810   TROPONINI <0.01     BNP: No results for input(s): BNP in the last 72 hours. Lipids: No results for input(s): CHOL, HDL in the last 72 hours. Invalid input(s): LDLCALCU, TRIGLYCERIDE  ABGs:  No results for input(s): PHART, COO8VKR, PO2ART, FDW0XQF, BEART, THGBART, F0XPZTII, RVZ3RTS in the last 72 hours. INR: No results for input(s): INR in the last 72 hours. Lactate: No results for input(s): LACTATE in the last 72 hours. Cultures:  -----------------------------------------------------------------  RAD:   CT CHEST PULMONARY EMBOLISM W CONTRAST   Final Result      1. Bilateral pulmonary thromboembolus, in the branch vessels proximal mid and distal without signs of any pleural effusion, pulmonary infarct or right heart strain at this time. No parenchymal consolidation      2. No sign of any aortic aneurysm or dissection.  Fatty liver and hepatic cystic change noted      Results called to the emergency room resident physician taking care of this patient at 2025 hours      XR CHEST PORTABLE   Final Result      1. No acute process or consolidation   2. Stable                   Assessment/Plan:   64 y/o F with PMH of recent covid infection (positive 1/9/21), DMT2, IgG def, presented with R leg pain and dyspnea of 1 day duration. Found to have bilateral PE. Hemodynamically stable w/o R heart strain or hypoxia. Admitted for anticoagulation for bilateral PE and possible R leg DVT likely provoked by recent covid infection in setting of sedentary lifestyle. Acute bilateral pulmonary embolism likely 2/2 recent COVID infection  Dyspnea on exertion, pleuritic chest pain. COVID positive 1/9/2021 (not hospitalized). Sedentary desk job. 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 EKG NSR with biphasic P waves. CT imaging with bilateral PE without right heart strain  - heparin gtt  - PT/OT, encourage ambulation    R leg pain likely 2/2 possible DVT  - as above  - tylenol prn    Mild hyponatremia  - encourage po intake    DM 2- LDSSI  HTN - holding home maxide 25mg in setting of acute PEs  Psoriatic arthritis - ortesla 30 BID at home   IgG deficiency - receives transfusion at home every 28 days, pt due for transfusion on 2/11. Depression - cont Cymbalta 30mg daily   Vit D def - takes supplements at home     Code Status: full code  FEN: general diet  PPX: heparin gtt  DISPO: vera Mejia Patient, DO, PGY-1   02/06/21  8:55 AM    This patient has been staffed and discussed with Wily Oneill MD.         Patient seen and examined, plan of care discussed with residents. Agree with their assessment and plan with following addendum:    [facility] She reported primarily fatigue and URI type symptoms. Never had DVT/PE, but mother was diagnosed with pancreatic cancer after she had DVTs and she worries about the same. She is having some bloating, which also could be due to COVID.  Will check her renal function panel tomorrow and if normal- will get pancreatic protocol CT.      Basia Peters

## 2021-02-06 NOTE — H&P
tightness and shortness of breath. Negative for cough and wheezing. Cardiovascular: Positive for chest pain. Negative for palpitations and leg swelling. Gastrointestinal: Negative for abdominal pain, constipation, diarrhea, nausea and vomiting. Genitourinary: Negative for dysuria. Musculoskeletal: Positive for myalgias. Negative for back pain. Neurological: Negative for syncope, weakness and light-headedness. ROS: A 10 point review of systems was conducted, significant findings as noted in HPI. Physical Exam  Constitutional:       General: She is not in acute distress. Appearance: Normal appearance. HENT:      Head: Normocephalic and atraumatic. Cardiovascular:      Rate and Rhythm: Normal rate and regular rhythm. Pulses: Normal pulses. Heart sounds: Normal heart sounds. No murmur. Pulmonary:      Effort: Pulmonary effort is normal. No respiratory distress. Breath sounds: Normal breath sounds. No wheezing. Abdominal:      General: There is no distension. Palpations: Abdomen is soft. Tenderness: There is no abdominal tenderness. Musculoskeletal:         General: Tenderness (r calf) present. No swelling. Right lower leg: No edema. Left lower leg: No edema. Skin:     Findings: Lesion (scalp 2/2 psoriasis) present. No rash. Neurological:      General: No focal deficit present. Mental Status: She is oriented to person, place, and time. Physical exam:       Vitals:    02/05/21 2244   BP: (!) 159/87   Pulse: 109   Resp: 22   Temp: 97.8 °F (36.6 °C)   SpO2: 95%       DATA:    Labs:  CBC:   Recent Labs     02/05/21  1810   WBC 10.4   HGB 15.9   HCT 46.6          BMP:   Recent Labs     02/05/21  1810   *   K 3.7   CL 97*   CO2 23   BUN 10   CREATININE <0.5*   GLUCOSE 255*     LFT's: No results for input(s): AST, ALT, ALB, BILITOT, ALKPHOS in the last 72 hours.   Troponin:   Recent Labs     02/05/21  1810   TROPONINI <0.01 BNP:No results for input(s): BNP in the last 72 hours. ABGs: No results for input(s): PHART, ISL2QOL, PO2ART in the last 72 hours. INR: No results for input(s): INR in the last 72 hours. U/A:No results for input(s): NITRITE, COLORU, PHUR, LABCAST, WBCUA, RBCUA, MUCUS, TRICHOMONAS, YEAST, BACTERIA, CLARITYU, SPECGRAV, LEUKOCYTESUR, UROBILINOGEN, BILIRUBINUR, BLOODU, GLUCOSEU, AMORPHOUS in the last 72 hours. Invalid input(s): KETONESU    CT CHEST PULMONARY EMBOLISM W CONTRAST   Final Result      1. Bilateral pulmonary thromboembolus, in the branch vessels proximal mid and distal without signs of any pleural effusion, pulmonary infarct or right heart strain at this time. No parenchymal consolidation      2. No sign of any aortic aneurysm or dissection. Fatty liver and hepatic cystic change noted      Results called to the emergency room resident physician taking care of this patient at 2025 hours      XR CHEST PORTABLE   Final Result      1. No acute process or consolidation   2. Stable                       ASSESSMENT AND PLAN:    64 y/o F with PMH of recent covid infection (positive 1/9/21), DMT2, IgG def, presented with R leg pain and dyspnea of 1 day duration. Found to have bilateral PE. Hemodynamically stable w/o R heart strain or hypoxia. Admitted for anticoagulation for bilateral PE and possible R leg DVT likely provoked by recent covid infection in setting of sedentary lifestyle. Acute bilateral pulmonary embolism - likely 2/2 recent covid-19 infection   LE, pleuritic chest pain. Recent covid infection (tested positive 1/9/21, was not hypoxic or hospitalized), sedentary/desk job. Hemodynamically stable. D-dimer 981. Normal trop and pro-BNP. EKG w/ NSR, biphasic P wave. CXR w/o acute disease. CT imaging w/ bilateral PE w/o R heart strain.    - currently not hypoxic, monitor for oxygen req  - recent c-scope clear, pt reports normal screening mammograms, no previous CVT  - heparin gtt for now, monitor antiXa  - PT/OT, encourage early ambulation     R leg pain likely 2/2 possible DVT   - treatment as above   - tylenol     Mild hyponatremia - 2/2 dehydration   - encourage PO intake     DMT2  Pt on jentadueto at home. - LDSS, glu checks     HTN - holding home maxide 25mg in setting of acute PEs  Psoriatic arthritis - takes ortesla 30 BID at home   IgG deficiency - receives transfusion at home every 28 days, pt due for transfusion on 2/11.   Depression - cont Cymbalta 30mg daily   Vit D def - takes supplements at home     Will discuss with attending physician Dr. Guille Stone     Code Status: Full code  FEN: Gen diet  PPX: heparin gtt  DISPO: Charles Puryd MD PGY-1  2/5/2021,  11:46 PM

## 2021-02-06 NOTE — PROGRESS NOTES
At 2303- This RN placed call to lab to inform that Anti-XA had not been drawn at 2033 time while pt was in ER. Pt arrived to 6S with heparin gtt running at 18 units/kg/hr. 8851- Lab called to inform this RN of APTT 248. Resident informed via [employer] that lab was waiting on new order to draw Anti-XA. Heparin gtt paused at 0100.  0131- Lab called to inform that Anti-XA result greater that 1.80. Per heparin gtt protocol, heparin to be paused, which was already done because of APTT lab. Per synopsis report, Anti-XA drawn at [phone]. Labs not available through results in charting for APTT and Anti-XA at this time.

## 2021-02-07 LAB
ANION GAP SERPL CALCULATED.3IONS-SCNC: 8 MMOL/L (ref 3–16)
ANTI-XA UNFRAC HEPARIN: 0.44 IU/ML (ref 0.3–0.7)
ANTI-XA UNFRAC HEPARIN: 0.45 IU/ML (ref 0.3–0.7)
APTT: 58.7 SEC (ref 24.2–36.2)
BASOPHILS ABSOLUTE: 0 K/UL (ref 0–0.2)
BASOPHILS RELATIVE PERCENT: 0 %
BUN BLDV-MCNC: 13 MG/DL (ref 7–20)
CALCIUM SERPL-MCNC: 8.5 MG/DL (ref 8.3–10.6)
CHLORIDE BLD-SCNC: 101 MMOL/L (ref 99–110)
CO2: 27 MMOL/L (ref 21–32)
CREAT SERPL-MCNC: <0.5 MG/DL (ref 0.6–1.2)
EOSINOPHILS ABSOLUTE: 0.2 K/UL (ref 0–0.6)
EOSINOPHILS RELATIVE PERCENT: 2 %
GFR AFRICAN AMERICAN: >60
GFR NON-AFRICAN AMERICAN: >60
GLUCOSE BLD-MCNC: 193 MG/DL (ref 70–99)
GLUCOSE BLD-MCNC: 241 MG/DL (ref 70–99)
GLUCOSE BLD-MCNC: 243 MG/DL (ref 70–99)
GLUCOSE BLD-MCNC: 252 MG/DL (ref 70–99)
GLUCOSE BLD-MCNC: 265 MG/DL (ref 70–99)
HCT VFR BLD CALC: 40.6 % (ref 36–48)
HCT VFR BLD CALC: 43.6 % (ref 36–48)
HEMOGLOBIN: 14.5 G/DL (ref 12–16)
HEMOGLOBIN: 15.4 G/DL (ref 12–16)
INR BLD: 1.18 (ref 0.86–1.14)
LYMPHOCYTES ABSOLUTE: 2.4 K/UL (ref 1–5.1)
LYMPHOCYTES RELATIVE PERCENT: 29 %
MCH RBC QN AUTO: 30.5 PG (ref 26–34)
MCH RBC QN AUTO: 32 PG (ref 26–34)
MCHC RBC AUTO-ENTMCNC: 35.3 G/DL (ref 31–36)
MCHC RBC AUTO-ENTMCNC: 35.6 G/DL (ref 31–36)
MCV RBC AUTO: 85.5 FL (ref 80–100)
MCV RBC AUTO: 90.6 FL (ref 80–100)
MONOCYTES ABSOLUTE: 0.7 K/UL (ref 0–1.3)
MONOCYTES RELATIVE PERCENT: 9 %
NEUTROPHILS ABSOLUTE: 4.9 K/UL (ref 1.7–7.7)
NEUTROPHILS RELATIVE PERCENT: 60 %
PDW BLD-RTO: 13.4 % (ref 12.4–15.4)
PDW BLD-RTO: 13.5 % (ref 12.4–15.4)
PERFORMED ON: ABNORMAL
PLATELET # BLD: 211 K/UL (ref 135–450)
PLATELET # BLD: 256 K/UL (ref 135–450)
PMV BLD AUTO: 7.6 FL (ref 5–10.5)
PMV BLD AUTO: 8.1 FL (ref 5–10.5)
POTASSIUM REFLEX MAGNESIUM: 3.8 MMOL/L (ref 3.5–5.1)
PROTHROMBIN TIME: 13.7 SEC (ref 10–13.2)
RBC # BLD: 4.75 M/UL (ref 4–5.2)
RBC # BLD: 4.81 M/UL (ref 4–5.2)
RBC # BLD: NORMAL 10*6/UL
SODIUM BLD-SCNC: 136 MMOL/L (ref 136–145)
WBC # BLD: 8.2 K/UL (ref 4–11)
WBC # BLD: 9.8 K/UL (ref 4–11)

## 2021-02-07 PROCEDURE — 6370000000 HC RX 637 (ALT 250 FOR IP): Performed by: INTERNAL MEDICINE

## 2021-02-07 PROCEDURE — 85610 PROTHROMBIN TIME: CPT

## 2021-02-07 PROCEDURE — 6370000000 HC RX 637 (ALT 250 FOR IP): Performed by: STUDENT IN AN ORGANIZED HEALTH CARE EDUCATION/TRAINING PROGRAM

## 2021-02-07 PROCEDURE — 85730 THROMBOPLASTIN TIME PARTIAL: CPT

## 2021-02-07 PROCEDURE — 6360000002 HC RX W HCPCS: Performed by: STUDENT IN AN ORGANIZED HEALTH CARE EDUCATION/TRAINING PROGRAM

## 2021-02-07 PROCEDURE — 85520 HEPARIN ASSAY: CPT

## 2021-02-07 PROCEDURE — 36415 COLL VENOUS BLD VENIPUNCTURE: CPT

## 2021-02-07 PROCEDURE — 2580000003 HC RX 258: Performed by: STUDENT IN AN ORGANIZED HEALTH CARE EDUCATION/TRAINING PROGRAM

## 2021-02-07 PROCEDURE — 83036 HEMOGLOBIN GLYCOSYLATED A1C: CPT

## 2021-02-07 PROCEDURE — 97161 PT EVAL LOW COMPLEX 20 MIN: CPT

## 2021-02-07 PROCEDURE — 85025 COMPLETE CBC W/AUTO DIFF WBC: CPT

## 2021-02-07 PROCEDURE — 80048 BASIC METABOLIC PNL TOTAL CA: CPT

## 2021-02-07 PROCEDURE — 2060000000 HC ICU INTERMEDIATE R&B

## 2021-02-07 PROCEDURE — 85027 COMPLETE CBC AUTOMATED: CPT

## 2021-02-07 PROCEDURE — 6360000002 HC RX W HCPCS

## 2021-02-07 RX ORDER — INSULIN LISPRO 100 [IU]/ML
5 INJECTION, SOLUTION INTRAVENOUS; SUBCUTANEOUS
Status: DISCONTINUED | OUTPATIENT
Start: 2021-02-07 | End: 2021-02-08 | Stop reason: HOSPADM

## 2021-02-07 RX ORDER — HEPARIN SODIUM 1000 [USP'U]/ML
80 INJECTION, SOLUTION INTRAVENOUS; SUBCUTANEOUS ONCE
Status: DISCONTINUED | OUTPATIENT
Start: 2021-02-07 | End: 2021-02-08

## 2021-02-07 RX ORDER — HEPARIN SODIUM 1000 [USP'U]/ML
40 INJECTION, SOLUTION INTRAVENOUS; SUBCUTANEOUS PRN
Status: DISCONTINUED | OUTPATIENT
Start: 2021-02-07 | End: 2021-02-08

## 2021-02-07 RX ORDER — INSULIN LISPRO 100 [IU]/ML
0-6 INJECTION, SOLUTION INTRAVENOUS; SUBCUTANEOUS
Status: DISCONTINUED | OUTPATIENT
Start: 2021-02-07 | End: 2021-02-08 | Stop reason: HOSPADM

## 2021-02-07 RX ORDER — DIPHENHYDRAMINE HCL 25 MG
50 TABLET ORAL ONCE
Status: COMPLETED | OUTPATIENT
Start: 2021-02-07 | End: 2021-02-08

## 2021-02-07 RX ORDER — HEPARIN SODIUM 10000 [USP'U]/100ML
12 INJECTION, SOLUTION INTRAVENOUS CONTINUOUS
Status: DISCONTINUED | OUTPATIENT
Start: 2021-02-07 | End: 2021-02-08

## 2021-02-07 RX ORDER — INSULIN LISPRO 100 [IU]/ML
0-3 INJECTION, SOLUTION INTRAVENOUS; SUBCUTANEOUS NIGHTLY
Status: DISCONTINUED | OUTPATIENT
Start: 2021-02-07 | End: 2021-02-08 | Stop reason: HOSPADM

## 2021-02-07 RX ORDER — PREDNISONE 50 MG/1
50 TABLET ORAL EVERY 6 HOURS
Status: COMPLETED | OUTPATIENT
Start: 2021-02-07 | End: 2021-02-08

## 2021-02-07 RX ORDER — HEPARIN SODIUM 1000 [USP'U]/ML
80 INJECTION, SOLUTION INTRAVENOUS; SUBCUTANEOUS PRN
Status: DISCONTINUED | OUTPATIENT
Start: 2021-02-07 | End: 2021-02-08

## 2021-02-07 RX ADMIN — ACETAMINOPHEN 650 MG: 325 TABLET ORAL at 19:57

## 2021-02-07 RX ADMIN — HEPARIN SODIUM 12 UNITS/KG/HR: 10000 INJECTION, SOLUTION INTRAVENOUS at 07:55

## 2021-02-07 RX ADMIN — INSULIN LISPRO 1 UNITS: 100 INJECTION, SOLUTION INTRAVENOUS; SUBCUTANEOUS at 13:04

## 2021-02-07 RX ADMIN — PREDNISONE 50 MG: 50 TABLET ORAL at 18:32

## 2021-02-07 RX ADMIN — INSULIN LISPRO 2 UNITS: 100 INJECTION, SOLUTION INTRAVENOUS; SUBCUTANEOUS at 21:01

## 2021-02-07 RX ADMIN — ACYCLOVIR 800 MG: 800 TABLET ORAL at 23:16

## 2021-02-07 RX ADMIN — HEPARIN SODIUM 12 UNITS/KG/HR: 10000 INJECTION, SOLUTION INTRAVENOUS at 19:48

## 2021-02-07 RX ADMIN — INSULIN LISPRO 5 UNITS: 100 INJECTION, SOLUTION INTRAVENOUS; SUBCUTANEOUS at 13:05

## 2021-02-07 RX ADMIN — ACYCLOVIR 800 MG: 800 TABLET ORAL at 18:32

## 2021-02-07 RX ADMIN — HEPARIN SODIUM 12 UNITS/KG/HR: 10000 INJECTION, SOLUTION INTRAVENOUS at 14:40

## 2021-02-07 RX ADMIN — INSULIN LISPRO 2 UNITS: 100 INJECTION, SOLUTION INTRAVENOUS; SUBCUTANEOUS at 18:33

## 2021-02-07 RX ADMIN — Medication 10 ML: at 10:20

## 2021-02-07 RX ADMIN — ACETAMINOPHEN 650 MG: 325 TABLET ORAL at 06:46

## 2021-02-07 RX ADMIN — INSULIN LISPRO 5 UNITS: 100 INJECTION, SOLUTION INTRAVENOUS; SUBCUTANEOUS at 18:34

## 2021-02-07 RX ADMIN — ACYCLOVIR 800 MG: 800 TABLET ORAL at 10:15

## 2021-02-07 RX ADMIN — DULOXETINE HYDROCHLORIDE 30 MG: 30 CAPSULE, DELAYED RELEASE ORAL at 10:15

## 2021-02-07 RX ADMIN — ACETAMINOPHEN 650 MG: 325 TABLET ORAL at 00:00

## 2021-02-07 RX ADMIN — PREDNISONE 50 MG: 50 TABLET ORAL at 23:16

## 2021-02-07 RX ADMIN — ACYCLOVIR 800 MG: 800 TABLET ORAL at 06:46

## 2021-02-07 RX ADMIN — ACYCLOVIR 800 MG: 800 TABLET ORAL at 14:46

## 2021-02-07 RX ADMIN — ACETAMINOPHEN 650 MG: 325 TABLET ORAL at 14:46

## 2021-02-07 RX ADMIN — INSULIN LISPRO 4 UNITS: 100 INJECTION, SOLUTION INTRAVENOUS; SUBCUTANEOUS at 10:20

## 2021-02-07 RX ADMIN — ACYCLOVIR 800 MG: 800 TABLET ORAL at 00:00

## 2021-02-07 ASSESSMENT — PAIN - FUNCTIONAL ASSESSMENT: PAIN_FUNCTIONAL_ASSESSMENT: ACTIVITIES ARE NOT PREVENTED

## 2021-02-07 ASSESSMENT — ENCOUNTER SYMPTOMS
NAUSEA: 0
SHORTNESS OF BREATH: 1
CHEST TIGHTNESS: 0
DIARRHEA: 0
COUGH: 0
ABDOMINAL PAIN: 0
VOMITING: 0
WHEEZING: 0

## 2021-02-07 ASSESSMENT — PAIN DESCRIPTION - LOCATION: LOCATION: LEG

## 2021-02-07 ASSESSMENT — PAIN DESCRIPTION - ONSET
ONSET: ON-GOING
ONSET: ON-GOING

## 2021-02-07 ASSESSMENT — PAIN DESCRIPTION - FREQUENCY
FREQUENCY: INTERMITTENT

## 2021-02-07 ASSESSMENT — PAIN SCALES - GENERAL
PAINLEVEL_OUTOF10: 4
PAINLEVEL_OUTOF10: 2
PAINLEVEL_OUTOF10: 3
PAINLEVEL_OUTOF10: 0

## 2021-02-07 ASSESSMENT — PAIN DESCRIPTION - ORIENTATION
ORIENTATION: RIGHT

## 2021-02-07 ASSESSMENT — PAIN DESCRIPTION - PROGRESSION: CLINICAL_PROGRESSION: NOT CHANGED

## 2021-02-07 ASSESSMENT — PAIN DESCRIPTION - DESCRIPTORS
DESCRIPTORS: ACHING
DESCRIPTORS: ACHING

## 2021-02-07 ASSESSMENT — PAIN DESCRIPTION - PAIN TYPE: TYPE: ACUTE PAIN

## 2021-02-07 NOTE — PROGRESS NOTES
Physical Therapy    Facility/Department: [address] [facility] [address]  Initial Assessment    NAME: Tarsha Wakefield  : 1960  MRN: [id number]    Date of Service: 2021    Discharge Recommendations:  Tarsha Wakefield scored a 24/24 on the AM-PAC short mobility form. At this time, no further PT is recommended upon discharge due to patient at baseline. Recommend patient returns to prior setting with prior services. PT Equipment Recommendations  Equipment Needed: No    Assessment   Assessment: Patient near baseline - limited by right LE pain/\"cramping\" - slightly antalgic gait. Patient steady. Denies questions or concerns related to PT. No additional PT warranted at this time. Plan to d/c from PT  Prognosis: Good  Decision Making: Low Complexity  Patient Education: Patient educated on role of PT, use of call light, and PT recommendations - patient verbalizes understanding. REQUIRES PT FOLLOW UP: No  Activity Tolerance  Activity Tolerance: Patient Tolerated treatment well       Patient Diagnosis(es): The encounter diagnosis was Other acute pulmonary embolism without acute cor pulmonale ([facility] [address] has a past medical history of Arthritis, Depression, Diabetes mellitus ([facility] [address] Hypertension, IgG deficiency ([facility] [address] Psoriatic arthritis ([facility] [address] and Vitamin D deficiency. has no past surgical history on file. Restrictions  Restrictions/Precautions  Restrictions/Precautions: (medium fall risk)  Position Activity Restriction  Other position/activity restrictions: contact precautions, diet carb control, up with assistancee  Vision/Hearing  Vision: Impaired  Vision Exceptions: Wears glasses for reading  Hearing: Within functional limits     Subjective  General  Chart Reviewed:  Yes  Additional Pertinent Hx: ED 2/5 related to SOB, acute (B) PE, right LE pain - pending dopplers; PMH: recent covid infection (+ 21), DM2, HTN, psoriatic arthritis, HTN, IgG def, depression  Family / Caregiver Present: No  Referring danielle)      AM-PAC Score  AM-PAC Inpatient Mobility Raw Score : 24 (02/07/21 1410)  AM-PAC Inpatient T-Scale Score : 61.14 (02/07/21 1410)  Mobility Inpatient CMS 0-100% Score: 0 (02/07/21 1410)  Mobility Inpatient CMS G-Code Modifier : CH (02/07/21 1410)          Goals  Short term goals  Time Frame for Short term goals: no acute PT goals identified  Patient Goals   Patient goals : \"to go home\"       Therapy Time   Individual Concurrent Group Co-treatment   Time In 1359         Time Out 1414         Minutes 15                 Timed Code Treatment Minutes: 0 minutes    Total Treatment Minutes: 15 Minutes    If patient discharges prior to next treatment, this note will serve as discharge summary.     Martha Maravilla PT, DPT #[id number]

## 2021-02-07 NOTE — PROGRESS NOTES
Patient reports 3/10 right calf and leg pain, tylenol given, vitals stable, assisted to position of comfort, patient encouraged to call with needs, call light in reach, items within reach, will continue to monitor

## 2021-02-07 NOTE — PROGRESS NOTES
vomiting. Genitourinary: Negative for dysuria. Neurological: Negative for dizziness, light-headedness and numbness. Physical Exam  Vitals signs reviewed. HENT:      Nose: Nose normal.      Mouth/Throat:      Mouth: Mucous membranes are moist.   Cardiovascular:      Rate and Rhythm: Normal rate and regular rhythm. Pulses: Normal pulses. Pulmonary:      Effort: Pulmonary effort is normal. No respiratory distress. Breath sounds: Normal breath sounds. Abdominal:      General: There is no distension. Palpations: There is no mass. Skin:     General: Skin is warm. Neurological:      General: No focal deficit present. Mental Status: She is alert and oriented to person, place, and time. LABS:    CBC:   Recent Labs     02/05/21 1810 02/06/21 0819 02/07/21  0822   WBC 10.4 12.9* 8.2   HGB 15.9 15.5 14.5   HCT 46.6 45.1 40.6    270 211   MCV 88.4 87.4 85.5     Renal:    Recent Labs     02/05/21 1810 02/06/21 0819 02/07/21  0822   * 138 136   K 3.7 3.9 3.8   CL 97* 103 101   CO2 23 23 27   BUN 10 15 13   CREATININE <0.5* 0.6 <0.5*   GLUCOSE 255* 255* 265*   CALCIUM 9.3 9.5 8.5   ANIONGAP 12 12 8     Hepatic: No results for input(s): AST, ALT, BILITOT, BILIDIR, PROT, LABALBU, ALKPHOS in the last 72 hours. Troponin:   Recent Labs     02/05/21 1810   TROPONINI <0.01     BNP: No results for input(s): BNP in the last 72 hours. Lipids: No results for input(s): CHOL, HDL in the last 72 hours. Invalid input(s): LDLCALCU, TRIGLYCERIDE  ABGs:  No results for input(s): PHART, UMY3FTO, PO2ART, NXT2IAQ, BEART, THGBART, W5AYYZIF, GKH4JMZ in the last 72 hours. INR: No results for input(s): INR in the last 72 hours. Lactate: No results for input(s): LACTATE in the last 72 hours. Cultures:  -----------------------------------------------------------------  RAD:   CT CHEST PULMONARY EMBOLISM W CONTRAST   Final Result      1.  Bilateral pulmonary thromboembolus, in the branch vessels proximal mid and distal without signs of any pleural effusion, pulmonary infarct or right heart strain at this time. No parenchymal consolidation      2. No sign of any aortic aneurysm or dissection. Fatty liver and hepatic cystic change noted      Results called to the emergency room resident physician taking care of this patient at 2025 hours      XR CHEST PORTABLE   Final Result      1. No acute process or consolidation   2. Stable               VL Extremity Venous Bilateral    (Results Pending)       Assessment/Plan:   62 y/o F with PMH of recent covid infection (positive 1/9/21), DMT2, IgG def, presented with R leg pain and dyspnea of 1 day duration. Found to have bilateral PE. Hemodynamically stable w/o R heart strain or hypoxia. Admitted for anticoagulation for bilateral PE and possible R leg DVT likely provoked by recent covid infection in setting of sedentary lifestyle. Acute bilateral pulmonary embolism likely 2/2 recent COVID infection  Dyspnea on exertion, pleuritic chest pain. COVID positive 1/9/2021 (not hospitalized). Sedentary desk job. 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 EKG NSR with biphasic P waves. CT imaging with bilateral PE without right heart strain  - heparin gtt. Transition to [address] after Echo  - Will get CT with pancreatic protocol today (cr stable) - given family hx of pancreatic cancer in mother diagnosed after acute PE/DVT  - PT/OT, encourage ambulation  - Get Echo, LE duplex   - Heme consult, appreciate rec    R leg pain likely 2/2 possible DVT  - as above  - tylenol prn  - Pending duplex     Mild hyponatremia  - encourage po intake    DM 2  Uncontrolled. - Lantus change to 24 units at night time  - Added 5 u of lispro with meal   - low dose sliding scale  - Pending A1C    HTN - holding home maxide 25mg in setting of acute PEs  Psoriatic arthritis - ortesla 30 BID at home   IgG deficiency - receives transfusion at home every 28 days, pt due for transfusion on 2/11.   Depression - cont Cymbalta 30mg daily   Vit D def - takes supplements at home     Code Status: full code  FEN: general diet  PPX: heparin gtt  DISPO: ip    Lorrie Claros MD, PGY-2  02/07/21  10:44 AM    This patient has been staffed and discussed with Brandyn Timmons MD.

## 2021-02-07 NOTE — PROGRESS NOTES
Occupational Therapy  OT orders received and chart reviewed. Pt is up ad danielle and declined any OT needs at this time. Signing off.   Leesa Suarez, MOT-OTR/L [id number]

## 2021-02-08 ENCOUNTER — APPOINTMENT (OUTPATIENT)
Dept: VASCULAR LAB | Age: 61
DRG: 176 | End: 2021-02-08
Payer: COMMERCIAL

## 2021-02-08 ENCOUNTER — APPOINTMENT (OUTPATIENT)
Dept: CT IMAGING | Age: 61
DRG: 176 | End: 2021-02-08
Payer: COMMERCIAL

## 2021-02-08 VITALS
HEIGHT: 69 IN | WEIGHT: 235 LBS | RESPIRATION RATE: 18 BRPM | SYSTOLIC BLOOD PRESSURE: 142 MMHG | OXYGEN SATURATION: 93 % | DIASTOLIC BLOOD PRESSURE: 87 MMHG | BODY MASS INDEX: 34.8 KG/M2 | HEART RATE: 95 BPM | TEMPERATURE: 98.5 F

## 2021-02-08 LAB
ANION GAP SERPL CALCULATED.3IONS-SCNC: 11 MMOL/L (ref 3–16)
ANTI-XA UNFRAC HEPARIN: 0.43 IU/ML (ref 0.3–0.7)
BUN BLDV-MCNC: 11 MG/DL (ref 7–20)
CALCIUM SERPL-MCNC: 8.9 MG/DL (ref 8.3–10.6)
CHLORIDE BLD-SCNC: 100 MMOL/L (ref 99–110)
CO2: 21 MMOL/L (ref 21–32)
CREAT SERPL-MCNC: <0.5 MG/DL (ref 0.6–1.2)
ESTIMATED AVERAGE GLUCOSE: 211.6 MG/DL
GFR AFRICAN AMERICAN: >60
GFR NON-AFRICAN AMERICAN: >60
GLUCOSE BLD-MCNC: 238 MG/DL (ref 70–99)
GLUCOSE BLD-MCNC: 238 MG/DL (ref 70–99)
GLUCOSE BLD-MCNC: 286 MG/DL (ref 70–99)
HBA1C MFR BLD: 9 %
HCT VFR BLD CALC: 44.1 % (ref 36–48)
HEMOGLOBIN: 15 G/DL (ref 12–16)
LV EF: 58 %
LVEF MODALITY: NORMAL
MCH RBC QN AUTO: 29.8 PG (ref 26–34)
MCHC RBC AUTO-ENTMCNC: 34.1 G/DL (ref 31–36)
MCV RBC AUTO: 87.6 FL (ref 80–100)
PDW BLD-RTO: 13.3 % (ref 12.4–15.4)
PERFORMED ON: ABNORMAL
PERFORMED ON: ABNORMAL
PLATELET # BLD: 268 K/UL (ref 135–450)
PMV BLD AUTO: 7.8 FL (ref 5–10.5)
POTASSIUM REFLEX MAGNESIUM: 4.2 MMOL/L (ref 3.5–5.1)
RBC # BLD: 5.03 M/UL (ref 4–5.2)
SODIUM BLD-SCNC: 132 MMOL/L (ref 136–145)
WBC # BLD: 12.7 K/UL (ref 4–11)

## 2021-02-08 PROCEDURE — 80048 BASIC METABOLIC PNL TOTAL CA: CPT

## 2021-02-08 PROCEDURE — 6370000000 HC RX 637 (ALT 250 FOR IP): Performed by: INTERNAL MEDICINE

## 2021-02-08 PROCEDURE — 85027 COMPLETE CBC AUTOMATED: CPT

## 2021-02-08 PROCEDURE — 6370000000 HC RX 637 (ALT 250 FOR IP): Performed by: STUDENT IN AN ORGANIZED HEALTH CARE EDUCATION/TRAINING PROGRAM

## 2021-02-08 PROCEDURE — 6360000004 HC RX CONTRAST MEDICATION: Performed by: STUDENT IN AN ORGANIZED HEALTH CARE EDUCATION/TRAINING PROGRAM

## 2021-02-08 PROCEDURE — 93970 EXTREMITY STUDY: CPT

## 2021-02-08 PROCEDURE — 93306 TTE W/DOPPLER COMPLETE: CPT

## 2021-02-08 PROCEDURE — 74178 CT ABD&PLV WO CNTR FLWD CNTR: CPT

## 2021-02-08 PROCEDURE — 36415 COLL VENOUS BLD VENIPUNCTURE: CPT

## 2021-02-08 PROCEDURE — 85520 HEPARIN ASSAY: CPT

## 2021-02-08 PROCEDURE — 6360000002 HC RX W HCPCS: Performed by: STUDENT IN AN ORGANIZED HEALTH CARE EDUCATION/TRAINING PROGRAM

## 2021-02-08 RX ORDER — PEN NEEDLE, DIABETIC 29 GAUGE
1 NEEDLE, DISPOSABLE MISCELLANEOUS DAILY
Qty: 100 EACH | Refills: 1 | Status: SHIPPED | OUTPATIENT
Start: 2021-02-08

## 2021-02-08 RX ORDER — LANCETS 30 GAUGE
1 EACH MISCELLANEOUS DAILY
Qty: 100 EACH | Refills: 5 | Status: SHIPPED | OUTPATIENT
Start: 2021-02-08

## 2021-02-08 RX ORDER — GLUCOSAMINE HCL/CHONDROITIN SU 500-400 MG
CAPSULE ORAL
Qty: 90 STRIP | Refills: 0 | Status: SHIPPED | OUTPATIENT
Start: 2021-02-08

## 2021-02-08 RX ORDER — INSULIN LISPRO 100 [IU]/ML
5 INJECTION, SOLUTION INTRAVENOUS; SUBCUTANEOUS
Qty: 1 PEN | Refills: 0 | Status: SHIPPED | OUTPATIENT
Start: 2021-02-08

## 2021-02-08 RX ORDER — BLOOD-GLUCOSE METER
1 KIT MISCELLANEOUS DAILY
Qty: 1 KIT | Refills: 0 | Status: SHIPPED | OUTPATIENT
Start: 2021-02-08

## 2021-02-08 RX ADMIN — DULOXETINE HYDROCHLORIDE 30 MG: 30 CAPSULE, DELAYED RELEASE ORAL at 08:16

## 2021-02-08 RX ADMIN — PREDNISONE 50 MG: 50 TABLET ORAL at 06:24

## 2021-02-08 RX ADMIN — ACYCLOVIR 800 MG: 800 TABLET ORAL at 12:42

## 2021-02-08 RX ADMIN — ACETAMINOPHEN 650 MG: 325 TABLET ORAL at 08:35

## 2021-02-08 RX ADMIN — ACYCLOVIR 800 MG: 800 TABLET ORAL at 06:24

## 2021-02-08 RX ADMIN — IOHEXOL 50 ML: 240 INJECTION, SOLUTION INTRATHECAL; INTRAVASCULAR; INTRAVENOUS; ORAL at 10:11

## 2021-02-08 RX ADMIN — ACYCLOVIR 800 MG: 800 TABLET ORAL at 15:01

## 2021-02-08 RX ADMIN — DIPHENHYDRAMINE HYDROCHLORIDE 50 MG: 25 TABLET ORAL at 06:24

## 2021-02-08 RX ADMIN — IOPAMIDOL 80 ML: 755 INJECTION, SOLUTION INTRAVENOUS at 10:11

## 2021-02-08 RX ADMIN — INSULIN LISPRO 2 UNITS: 100 INJECTION, SOLUTION INTRAVENOUS; SUBCUTANEOUS at 13:03

## 2021-02-08 RX ADMIN — APIXABAN 10 MG: 5 TABLET, FILM COATED ORAL at 15:01

## 2021-02-08 RX ADMIN — INSULIN LISPRO 2 UNITS: 100 INJECTION, SOLUTION INTRAVENOUS; SUBCUTANEOUS at 08:17

## 2021-02-08 RX ADMIN — HEPARIN SODIUM 12 UNITS/KG/HR: 10000 INJECTION, SOLUTION INTRAVENOUS at 01:50

## 2021-02-08 RX ADMIN — INSULIN LISPRO 5 UNITS: 100 INJECTION, SOLUTION INTRAVENOUS; SUBCUTANEOUS at 13:03

## 2021-02-08 ASSESSMENT — PAIN SCALES - GENERAL: PAINLEVEL_OUTOF10: 0

## 2021-02-08 ASSESSMENT — PAIN DESCRIPTION - FREQUENCY: FREQUENCY: INTERMITTENT

## 2021-02-08 ASSESSMENT — PAIN DESCRIPTION - LOCATION: LOCATION: LEG

## 2021-02-08 ASSESSMENT — PAIN DESCRIPTION - PROGRESSION: CLINICAL_PROGRESSION: GRADUALLY WORSENING

## 2021-02-08 ASSESSMENT — PAIN DESCRIPTION - DESCRIPTORS: DESCRIPTORS: ACHING

## 2021-02-08 ASSESSMENT — PAIN DESCRIPTION - ONSET: ONSET: ON-GOING

## 2021-02-08 NOTE — PLAN OF CARE
Problem: Pain:  Goal: Pain level will decrease  Description: Pain level will decrease  Outcome: Ongoing  Goal: Control of acute pain  Description: Control of acute pain  Outcome: Ongoing  Goal: Control of chronic pain  Description: Control of chronic pain  Outcome: Ongoing     Problem: Infection:  Goal: Will remain free from infection  Description: Will remain free from infection  Outcome: Ongoing     Problem: Daily Care:  Goal: Daily care needs are met  Description: Daily care needs are met  Outcome: Ongoing     Problem: Skin Integrity:  Goal: Skin integrity will stabilize  Description: Skin integrity will stabilize  Outcome: Ongoing    Problem: Discharge Planning:  Goal: Patients continuum of care needs are met  Description: Patients continuum of care needs are met  Outcome: Ongoing     Continue with plan of care.

## 2021-02-08 NOTE — PROGRESS NOTES
Physician Progress Note      PATIENT:               Beti Ascencio  CSN #:                  [id number]  :                       1960  ADMIT DATE:       2021 4:53 PM  [address] [place] DATE:  RESPONDING  PROVIDER #:        Venkata Marshall MD          QUERY TEXT:    Pt admitted with Bilateral PE'S and noted to have recent COVID-19 infection   testing + on 2021 with no repeat test during this admit. Please document   in progress notes and discharge summary if you are evaluating or treating any   of the following: The medical record reflects the following:  Risk Factors: 62 y/o F with PMH of recent covid infection (positive 21),   DMT2  Clinical Indicators:  PN Dr. Pamela Benito, \"Acute bilateral pulmonary embolism   likely 2/2 recent COVID infection Dyspnea on exertion, pleuritic chest pain. COVID positive 2021. Patient not in isolation and no repeat COVID Test on   this admit. Treatment:  heparin drip  Eliquis  Solumedrol IV 60 mg x 1 and then Prednisone 50 mg q6hrs  Get Echo, LE duplex  - Heme consult, appreciate rec  PT/OT, encourage ambulation  Options provided:  -- Acute bilateral pulmonary embolism due to Sequelae of recent COVID-19  -- Other - I will add my own diagnosis  -- Disagree - Not applicable / Not valid  -- Disagree - Clinically unable to determine / Unknown  -- Refer to Clinical Documentation Reviewer    PROVIDER RESPONSE TEXT:    This patient has Acute bilateral pulmonary embolism due to Sequelae of recent   COVID-19.     Query created by: Elle Fried on 2021 3:02 PM      Electronically signed by:  Venkata Marshall MD 2021 3:53 PM

## 2021-02-08 NOTE — CARE COORDINATION
Applicable    IMM Completed:   Not Indicated    Transportation:  Transportation Plan for discharge: family     Transportation form completed: Not Indicated    Home Care:  Home Care ordered at discharge: Not Indicated    Durable Medical Equipment:  DME Provider: N/A     Dialysis:  Dialysis patient: No    Referrals made at [facility] for outpatient continued care:  Not Applicable    Additional CM Notes:   SW rounded on this date. Patient medically cleared for discharge today. No home care or discharge needs noted at this time. SW available should needs arise. The Plan for Transition of Care is related to the following treatment goals Acute pulmonary embolism without acute cor pulmonale, unspecified pulmonary embolism type ([facility] [address] [I26.99]      The Patient and/or patient representative Patient was provided with a choice of provider and agrees with the discharge plan Yes    Freedom of choice list was provided with basic dialogue that supports the patient's individualized plan of care/goals and shares the quality data associated with the providers.  Yes    Care Transitions patient: No    LILLI Millan  [facility]  Case Management Department  Ph: [phone]

## 2021-02-08 NOTE — PROGRESS NOTES
Discharge order received. Discussed discharge orders with pt, provided education documents about blood clots, new medications, diabetes as well as instructed on use of insulin pen. Pt verbalized understanding.

## 2021-02-08 NOTE — DISCHARGE SUMMARY
INTERNAL MEDICINE    RESIDENT DISCHARGE SUMMARY   Discharge Summaries      Patient ID: Myla Olivas   Gender: female      :  1960  AGE: 63 y.o. MRN:  [id number]  Code Status: Full Code   PCP: Hanny Chavez    Admit date:  2021      Discharge date:  No discharge date for patient encounter. Admitting Physician:  Annell Siemens, MD    Discharge Physician: Liyah James DO    Admission Condition:  fair  Discharged Condition:  good Stable    Discharge Diagnoses:    Acute Pulmonary Embolism   DVT  DM T2  Hyperglycemia  Obesity       Active Hospital Problems    Diagnosis Date Noted    Acute pulmonary embolism without acute cor pulmonale ([facility] [address] [I26.99] 2021       The patient was seen and examined on day of discharge and this discharge summary is in conjunction with any daily progress note from day of discharge. Hospital Course:     Pt is a 62yo F with PMH of DMT2, HTN, obesity, psoriatic arthritis, IgG deficiency, depression and recent COVID infection presented to [facility] ER with R calf cramping pain and pleuritic chest pain starting same morning. In ER, Pt vitals were stable. Lab showed elevated D-dimers and CTPA showed acute bilateral pulmonary embolisms without R heart strain. Pt was started on a heparin drip and admitted to the general medicine wards for further management. Subsequent lower extremity doppler US showed R leg acute, partially to totally occluding deep venous thrombosis involving the femoral vein in the distal thigh, popliteal vein, posterior tibial, peroneal and soleal veins. Cardiac echo did not reveal evidence of pulmonary hypertension or R heart strain. Pt remained stable throughout course of stay. It was determined that patient be switched to PO anticoagulation and discharged.       Disposition:  Home    Physical Exam Performed: No acute abnormality identified. CT CHEST PULMONARY EMBOLISM W CONTRAST   Final Result      1. Bilateral pulmonary thromboembolus, in the branch vessels proximal mid and distal without signs of any pleural effusion, pulmonary infarct or right heart strain at this time. No parenchymal consolidation      2. No sign of any aortic aneurysm or dissection. Fatty liver and hepatic cystic change noted      Results called to the emergency room resident physician taking care of this patient at 2025 hours      XR CHEST PORTABLE   Final Result      1. No acute process or consolidation   2. Stable               VL Extremity Venous Bilateral    (Results Pending)          Consults:     IP CONSULT TO HOSPITALIST  IP CONSULT TO HEMATOLOGY      Discharge Instructions/Follow-up:      Pt advised to follow-up with PCP within 1-2 weeks post-discharge to continue care. Continue anticoagulation for 3 months. Activity: activity as tolerated    Diet: regular diet    Discharge Medications:     Current Discharge Medication List           Details   triamterene-hydroCHLOROthiazide (MAXZIDE-25) 37.5-25 MG per tablet Take 1 tablet by mouth daily      Apremilast (OTEZLA PO) Take by mouth      immune globulin, human, (GAMMAPLEX) 20 GM/200ML SOLN solution Infuse 30 g intravenously every 30 days Receives every 28 days      DULoxetine (CYMBALTA) 30 MG extended release capsule Take 30 mg by mouth daily      vitamin D3 (CHOLECALCIFEROL) 125 MCG (5000 UT) TABS tablet Take 1 tablet by mouth daily      linaGLIPtin-metFORMIN HCl (JENTADUETO PO) Take by mouth             Time Spent on discharge is more than 30 minutes in the examination, evaluation, counseling and review of medications and discharge plan.       Signed:    Johnny Flores DO   Internal Medicine Resident, PGY-1  2/8/2021    Ozzy Goldstein MS4 as trell Thank you Hanny Chavez for the opportunity to be involved in this patient's care. If you have any questions or concerns please feel free to contact me.

## 2021-04-22 ENCOUNTER — HOSPITAL ENCOUNTER (EMERGENCY)
Age: 61
Discharge: HOME | End: 2021-04-23
Attending: EMERGENCY MEDICINE
Payer: COMMERCIAL

## 2021-04-22 ENCOUNTER — APPOINTMENT (OUTPATIENT)
Dept: CT IMAGING | Age: 61
End: 2021-04-22
Payer: COMMERCIAL

## 2021-04-22 ENCOUNTER — APPOINTMENT (OUTPATIENT)
Dept: GENERAL RADIOLOGY | Age: 61
End: 2021-04-22
Payer: COMMERCIAL

## 2021-04-22 DIAGNOSIS — R06.02 SHORTNESS OF BREATH: ICD-10-CM

## 2021-04-22 DIAGNOSIS — R07.9 CHEST PAIN, UNSPECIFIED TYPE: Primary | ICD-10-CM

## 2021-04-22 LAB
ANION GAP SERPL CALCULATED.3IONS-SCNC: 11 MMOL/L (ref 3–16)
BASOPHILS ABSOLUTE: 0.1 K/UL (ref 0–0.2)
BASOPHILS RELATIVE PERCENT: 1.1 %
BUN BLDV-MCNC: 19 MG/DL (ref 7–20)
CALCIUM SERPL-MCNC: 9.1 MG/DL (ref 8.3–10.6)
CHLORIDE BLD-SCNC: 97 MMOL/L (ref 99–110)
CO2: 26 MMOL/L (ref 21–32)
CREAT SERPL-MCNC: 0.9 MG/DL (ref 0.6–1.2)
EKG ATRIAL RATE: 90 BPM
EKG DIAGNOSIS: NORMAL
EKG P AXIS: -2 DEGREES
EKG P-R INTERVAL: 138 MS
EKG Q-T INTERVAL: 390 MS
EKG QRS DURATION: 84 MS
EKG QTC CALCULATION (BAZETT): 477 MS
EKG R AXIS: 20 DEGREES
EKG T AXIS: 33 DEGREES
EKG VENTRICULAR RATE: 90 BPM
EOSINOPHILS ABSOLUTE: 0.3 K/UL (ref 0–0.6)
EOSINOPHILS RELATIVE PERCENT: 3.3 %
GFR AFRICAN AMERICAN: >60
GFR NON-AFRICAN AMERICAN: >60
GLUCOSE BLD-MCNC: 230 MG/DL (ref 70–99)
GLUCOSE BLD-MCNC: 252 MG/DL (ref 70–99)
GLUCOSE BLD-MCNC: 361 MG/DL (ref 70–99)
HCT VFR BLD CALC: 45.1 % (ref 36–48)
HEMOGLOBIN: 15.6 G/DL (ref 12–16)
INR BLD: 1.3 (ref 0.86–1.14)
LYMPHOCYTES ABSOLUTE: 2.3 K/UL (ref 1–5.1)
LYMPHOCYTES RELATIVE PERCENT: 23.6 %
MCH RBC QN AUTO: 30 PG (ref 26–34)
MCHC RBC AUTO-ENTMCNC: 34.5 G/DL (ref 31–36)
MCV RBC AUTO: 86.9 FL (ref 80–100)
MONOCYTES ABSOLUTE: 0.6 K/UL (ref 0–1.3)
MONOCYTES RELATIVE PERCENT: 6.1 %
NEUTROPHILS ABSOLUTE: 6.4 K/UL (ref 1.7–7.7)
NEUTROPHILS RELATIVE PERCENT: 65.9 %
PDW BLD-RTO: 13.4 % (ref 12.4–15.4)
PERFORMED ON: ABNORMAL
PERFORMED ON: ABNORMAL
PLATELET # BLD: 308 K/UL (ref 135–450)
PMV BLD AUTO: 7.7 FL (ref 5–10.5)
POTASSIUM REFLEX MAGNESIUM: 3.7 MMOL/L (ref 3.5–5.1)
PRO-BNP: 10 PG/ML (ref 0–124)
PROTHROMBIN TIME: 15.1 SEC (ref 10–13.2)
RBC # BLD: 5.19 M/UL (ref 4–5.2)
SODIUM BLD-SCNC: 134 MMOL/L (ref 136–145)
TROPONIN: <0.01 NG/ML
WBC # BLD: 9.6 K/UL (ref 4–11)

## 2021-04-22 PROCEDURE — 83880 ASSAY OF NATRIURETIC PEPTIDE: CPT

## 2021-04-22 PROCEDURE — 6360000002 HC RX W HCPCS: Performed by: STUDENT IN AN ORGANIZED HEALTH CARE EDUCATION/TRAINING PROGRAM

## 2021-04-22 PROCEDURE — 96374 THER/PROPH/DIAG INJ IV PUSH: CPT

## 2021-04-22 PROCEDURE — 71275 CT ANGIOGRAPHY CHEST: CPT

## 2021-04-22 PROCEDURE — 99284 EMERGENCY DEPT VISIT MOD MDM: CPT

## 2021-04-22 PROCEDURE — 80048 BASIC METABOLIC PNL TOTAL CA: CPT

## 2021-04-22 PROCEDURE — 85025 COMPLETE CBC W/AUTO DIFF WBC: CPT

## 2021-04-22 PROCEDURE — 71046 X-RAY EXAM CHEST 2 VIEWS: CPT

## 2021-04-22 PROCEDURE — 85610 PROTHROMBIN TIME: CPT

## 2021-04-22 PROCEDURE — 96372 THER/PROPH/DIAG INJ SC/IM: CPT

## 2021-04-22 PROCEDURE — 36415 COLL VENOUS BLD VENIPUNCTURE: CPT

## 2021-04-22 PROCEDURE — 93005 ELECTROCARDIOGRAM TRACING: CPT | Performed by: EMERGENCY MEDICINE

## 2021-04-22 PROCEDURE — 6370000000 HC RX 637 (ALT 250 FOR IP): Performed by: PHYSICIAN ASSISTANT

## 2021-04-22 PROCEDURE — 6360000004 HC RX CONTRAST MEDICATION: Performed by: STUDENT IN AN ORGANIZED HEALTH CARE EDUCATION/TRAINING PROGRAM

## 2021-04-22 PROCEDURE — 84484 ASSAY OF TROPONIN QUANT: CPT

## 2021-04-22 PROCEDURE — 6360000002 HC RX W HCPCS: Performed by: PHYSICIAN ASSISTANT

## 2021-04-22 PROCEDURE — 96375 TX/PRO/DX INJ NEW DRUG ADDON: CPT

## 2021-04-22 RX ORDER — DIPHENHYDRAMINE HYDROCHLORIDE 50 MG/ML
25 INJECTION INTRAMUSCULAR; INTRAVENOUS ONCE
Status: COMPLETED | OUTPATIENT
Start: 2021-04-22 | End: 2021-04-22

## 2021-04-22 RX ORDER — SODIUM CHLORIDE 0.9 % (FLUSH) 0.9 %
5-40 SYRINGE (ML) INJECTION PRN
Status: DISCONTINUED | OUTPATIENT
Start: 2021-04-22 | End: 2021-04-23 | Stop reason: HOSPADM

## 2021-04-22 RX ORDER — DIPHENHYDRAMINE HCL 25 MG
50 TABLET ORAL ONCE
Status: DISCONTINUED | OUTPATIENT
Start: 2021-04-22 | End: 2021-04-22

## 2021-04-22 RX ORDER — INSULIN LISPRO 100 [IU]/ML
0-6 INJECTION, SOLUTION INTRAVENOUS; SUBCUTANEOUS NIGHTLY
Status: DISCONTINUED | OUTPATIENT
Start: 2021-04-22 | End: 2021-04-23 | Stop reason: HOSPADM

## 2021-04-22 RX ORDER — INSULIN LISPRO 100 [IU]/ML
0.08 INJECTION, SOLUTION INTRAVENOUS; SUBCUTANEOUS
Status: DISCONTINUED | OUTPATIENT
Start: 2021-04-23 | End: 2021-04-23 | Stop reason: HOSPADM

## 2021-04-22 RX ORDER — DEXTROSE MONOHYDRATE 50 MG/ML
100 INJECTION, SOLUTION INTRAVENOUS PRN
Status: DISCONTINUED | OUTPATIENT
Start: 2021-04-22 | End: 2021-04-23 | Stop reason: HOSPADM

## 2021-04-22 RX ORDER — SODIUM CHLORIDE 0.9 % (FLUSH) 0.9 %
5-40 SYRINGE (ML) INJECTION EVERY 12 HOURS SCHEDULED
Status: DISCONTINUED | OUTPATIENT
Start: 2021-04-22 | End: 2021-04-23 | Stop reason: HOSPADM

## 2021-04-22 RX ORDER — NICOTINE POLACRILEX 4 MG
15 LOZENGE BUCCAL PRN
Status: DISCONTINUED | OUTPATIENT
Start: 2021-04-22 | End: 2021-04-23 | Stop reason: HOSPADM

## 2021-04-22 RX ORDER — INSULIN LISPRO 100 [IU]/ML
0-12 INJECTION, SOLUTION INTRAVENOUS; SUBCUTANEOUS EVERY 4 HOURS
Status: DISCONTINUED | OUTPATIENT
Start: 2021-04-23 | End: 2021-04-23 | Stop reason: HOSPADM

## 2021-04-22 RX ORDER — DEXTROSE MONOHYDRATE 25 G/50ML
12.5 INJECTION, SOLUTION INTRAVENOUS PRN
Status: DISCONTINUED | OUTPATIENT
Start: 2021-04-22 | End: 2021-04-23 | Stop reason: HOSPADM

## 2021-04-22 RX ORDER — TRAMADOL HYDROCHLORIDE 50 MG/1
50 TABLET ORAL EVERY 6 HOURS PRN
Status: DISCONTINUED | OUTPATIENT
Start: 2021-04-22 | End: 2021-04-23 | Stop reason: HOSPADM

## 2021-04-22 RX ORDER — ASPIRIN 81 MG/1
81 TABLET, CHEWABLE ORAL DAILY
Status: DISCONTINUED | OUTPATIENT
Start: 2021-04-22 | End: 2021-04-23 | Stop reason: HOSPADM

## 2021-04-22 RX ORDER — METHYLPREDNISOLONE SODIUM SUCCINATE 125 MG/2ML
60 INJECTION, POWDER, LYOPHILIZED, FOR SOLUTION INTRAMUSCULAR; INTRAVENOUS ONCE
Status: COMPLETED | OUTPATIENT
Start: 2021-04-22 | End: 2021-04-22

## 2021-04-22 RX ORDER — SODIUM CHLORIDE 9 MG/ML
25 INJECTION, SOLUTION INTRAVENOUS PRN
Status: DISCONTINUED | OUTPATIENT
Start: 2021-04-22 | End: 2021-04-23 | Stop reason: HOSPADM

## 2021-04-22 RX ORDER — INSULIN LISPRO 100 [IU]/ML
0-12 INJECTION, SOLUTION INTRAVENOUS; SUBCUTANEOUS
Status: DISCONTINUED | OUTPATIENT
Start: 2021-04-23 | End: 2021-04-22

## 2021-04-22 RX ADMIN — METHYLPREDNISOLONE SODIUM SUCCINATE 60 MG: 125 INJECTION, POWDER, FOR SOLUTION INTRAMUSCULAR; INTRAVENOUS at 18:50

## 2021-04-22 RX ADMIN — INSULIN GLARGINE 32 UNITS: 100 INJECTION, SOLUTION SUBCUTANEOUS at 23:12

## 2021-04-22 RX ADMIN — INSULIN LISPRO 2 UNITS: 100 INJECTION, SOLUTION INTRAVENOUS; SUBCUTANEOUS at 22:18

## 2021-04-22 RX ADMIN — TRAMADOL HYDROCHLORIDE 50 MG: 50 TABLET, FILM COATED ORAL at 22:07

## 2021-04-22 RX ADMIN — HYDROMORPHONE HYDROCHLORIDE 0.5 MG: 1 INJECTION, SOLUTION INTRAMUSCULAR; INTRAVENOUS; SUBCUTANEOUS at 23:58

## 2021-04-22 RX ADMIN — IOPAMIDOL 80 ML: 755 INJECTION, SOLUTION INTRAVENOUS at 19:02

## 2021-04-22 RX ADMIN — DIPHENHYDRAMINE HYDROCHLORIDE 25 MG: 50 INJECTION, SOLUTION INTRAMUSCULAR; INTRAVENOUS at 18:50

## 2021-04-22 ASSESSMENT — PAIN DESCRIPTION - LOCATION: LOCATION: LEG

## 2021-04-22 ASSESSMENT — HEART SCORE: ECG: 0

## 2021-04-22 ASSESSMENT — PAIN SCALES - GENERAL: PAINLEVEL_OUTOF10: 6

## 2021-04-22 NOTE — ED PROVIDER NOTES
[id number] [facility] RESIDENT NOTE       Date of evaluation: 4/22/2021    Chief Complaint     Shortness of Breath (PT HAD DVT AND PE IN FEBRUARY; YESTERDAY STARTED RIGHT LEG PAIN AND SOB)      History of Present Illness     Eli Kaur is a 63 y.o. female who presents with SOB, CP    Patient reports mild substernal chest pain that began last night, is pressure-like in quality, and has persisted ever since. It is not clearly associated with exertion. She has also had shortness of breath for the same duration. This is a new symptom that had been completely resolved compared to a prior episode in February when she was noted to have pulmonary emboli. She was started on Eliquis for this in February and says she has been completely compliant with it. She also had DVTs diagnosed in the right lower extremity at that time, and she says that she has new right lower extremity pain, with the primary area of new pain being in the right medial thigh but also on the right gastrocnemius region. There is no warmth swelling or trauma to the right lower extremity. Review of Systems     Review of Systems       ROS:   Positive as per HPI.   Negative for:    -Constitutional: fevers, weight loss    -Eyes:   Eye pain, eye discharge    -Ears/Nose/Throat: Ear pain, ear discharge    -Cardiovascular: Palpitations, cyanosis    -Respiratory:  cough    -Gastrointestinal: Abd pain, nausea, vomiting, melena, hematochezia    -Genitourinary: hematuria, dysuria, urinary frequency    -Neurological: numbness or weakness    -Skin:   Rash, pruritis,     -Hematologic: easy bleeding, easy bruising    -Musculoskeletal:  joint swelling, joint redness      Past Medical, Surgical, Family, and Social History     She has a past medical history of Arthritis, Depression, Diabetes mellitus ([address] DVT (deep vein thrombosis) in pregnancy, Hypertension, IgG deficiency ([address] Psoriatic arthritis ([address] Pulmonary emboli ([address] and Vitamin D deficiency. She has no past surgical history on file. Her family history is not on file. She reports that she has quit smoking. Her smoking use included cigarettes. She has never used smokeless tobacco. She reports current alcohol use of about 14.0 standard drinks of alcohol per week. She reports that she does not use drugs. Medications     Previous Medications    APIXABAN (ELIQUIS) 5 MG TABS TABLET    Take by mouth 2 times daily    APREMILAST (OTEZLA PO)    Take by mouth    BLOOD GLUCOSE MONITOR STRIPS    Test 3 times a day & as needed for symptoms of irregular blood glucose. Dispense sufficient amount for indicated testing frequency plus additional to accommodate PRN testing needs. DULOXETINE (CYMBALTA) 30 MG EXTENDED RELEASE CAPSULE    Take 30 mg by mouth daily    GLUCOSE MONITORING KIT (FREESTYLE) MONITORING KIT    1 kit by Does not apply route daily    IMMUNE GLOBULIN, HUMAN, (GAMMAPLEX) 20 GM/200ML SOLN SOLUTION    Infuse 30 g intravenously every 30 days Receives every 28 days    INSULIN GLARGINE (LANTUS;BASAGLAR) 100 UNIT/ML INJECTION PEN    Inject 24 Units into the skin nightly    INSULIN LISPRO, 1 UNIT DIAL, 100 UNIT/ML SOPN    Inject 5 Units into the skin 3 times daily (with meals)    INSULIN PEN NEEDLE ([employer] PEN NEEDLES 29G) 29G X 12MM MISC    1 box by Does not apply route daily    LANCETS MISC    1 each by Does not apply route daily    LINAGLIPTIN-METFORMIN HCL (JENTADUETO PO)    Take by mouth    TRIAMTERENE-HYDROCHLOROTHIAZIDE (MAXZIDE-25) 37.5-25 MG PER TABLET    Take 1 tablet by mouth daily    VITAMIN D3 (CHOLECALCIFEROL) 125 MCG (5000 UT) TABS TABLET    Take 1 tablet by mouth daily       Allergies     She is allergic to latex; iodides; topiramate; morphine and related; penicillins; phenazopyridine; and sulfa antibiotics.     Physical Exam     INITIAL VITALS: BP: (!) 155/97, Temp: 98.3 °F (36.8 °C), Pulse: 96, Resp: 18, SpO2: 96 %   Physical Exam       Vitals: Hematocrit 45.1 36.0 - 48.0 %    MCV 86.9 80.0 - 100.0 fL    MCH 30.0 26.0 - 34.0 pg    MCHC 34.5 31.0 - 36.0 g/dL    RDW 13.4 12.4 - 15.4 %    Platelets 259 701 - 261 K/uL    MPV 7.7 5.0 - 10.5 fL    Neutrophils % 65.9 %    Lymphocytes % 23.6 %    Monocytes % 6.1 %    Eosinophils % 3.3 %    Basophils % 1.1 %    Neutrophils Absolute 6.4 1.7 - 7.7 K/uL    Lymphocytes Absolute 2.3 1.0 - 5.1 K/uL    Monocytes Absolute 0.6 0.0 - 1.3 K/uL    Eosinophils Absolute 0.3 0.0 - 0.6 K/uL    Basophils Absolute 0.1 0.0 - 0.2 K/uL   Basic Metabolic Panel w/ Reflex to MG   Result Value Ref Range    Sodium 134 (L) 136 - 145 mmol/L    Potassium reflex Magnesium 3.7 3.5 - 5.1 mmol/L    Chloride 97 (L) 99 - 110 mmol/L    CO2 26 21 - 32 mmol/L    Anion Gap 11 3 - 16    Glucose 252 (H) 70 - 99 mg/dL    BUN 19 7 - 20 mg/dL    CREATININE 0.9 0.6 - 1.2 mg/dL    GFR Non-African American >60 >60    GFR African American >60 >60    Calcium 9.1 8.3 - 10.6 mg/dL   Protime-INR   Result Value Ref Range    Protime 15.1 (H) 10.0 - 13.2 sec    INR 1.30 (H) 0.86 - 1.14   Troponin   Result Value Ref Range    Troponin <0.01 <0.01 ng/mL   Brain Natriuretic Peptide   Result Value Ref Range    Pro-BNP 10 0 - 124 pg/mL   EKG 12 Lead   Result Value Ref Range    Ventricular Rate 90 BPM    Atrial Rate 90 BPM    P-R Interval 138 ms    QRS Duration 84 ms    Q-T Interval 390 ms    QTc Calculation (Bazett) 477 ms    P Axis -2 degrees    R Axis 20 degrees    T Axis 33 degrees    Diagnosis       EKG performed in ER and to be interpreted by ER physician. Confirmed by MD, ER ([id number]),  Esme August ([phone]) on 4/22/2021 7:05:48 PM       ED BEDSIDE ULTRASOUND:  None    RECENT VITALS:  BP: 133/76, Temp: 98.3 °F (36.8 °C), Pulse: 90,Resp: 18, SpO2: 98 %     Procedures     none    ED Course     Nursing Notes, Past Medical Hx, Past Surgical Hx, Social Hx, Allergies, and Family Hx were reviewed.     The patient was given the followingmedications:  Orders Placed This Encounter   Medications    DISCONTD: diphenhydrAMINE (BENADRYL) tablet 50 mg    DISCONTD: predniSONE (DELTASONE) tablet 50 mg    methylPREDNISolone sodium (SOLU-MEDROL) injection 60 mg    diphenhydrAMINE (BENADRYL) injection 25 mg    iopamidol (ISOVUE-370) 76 % injection 80 mL       CONSULTS:  None    MEDICAL DECISION MAKING / ASSESSMENT / Moon Fuel is a 63 y.o. female with hx DVT/PE who presents with new CP/SOB    This patient was also evaluated by the attending physician. All care plans were discussed and agreed upon. Based on our evaluation today, the patient's chest pain is not likely due to pneumonia or pneumothorax, based on the results of their CXR. Further, the CXR does not demonstrate any signs of mediastinal widening to suggest aortic dissection. There are also no signs of pleural effusion. It is also unlikely to be referred pain from the esophagus based on history and description of symptoms. The patient's EKG has no signs of pericarditis and there are no signs of ST-elevation on the EKG to indicate myocardial infarction. PE was considered carefully with concern for possible failure. Given her borderline tachycardia, I did feel further evaluation was required. Unfortunate the patient has a contrast allergy and required premedication which delayed obtaining a CTPA. This was pending at the time of signout. This was discussed with the patient and all questions were addressed and answered. The patient agrees with the plan, understands reasons to return to the ED and will follow-up as indicated. An initial troponin was negative, repeat troponin is pending. There is no elevation of the BMP had a low suspicion for acute diastolic heart failure. The INR is mildly elevated consistent with use of an anticoagulant. I recognize however this is a nonspecific finding.   The CBC is without leukocytosis or anemia to explain the patient's symptoms are strongly suggestive of infection. The electrolyte panel is unremarkable except for some mild hyperglycemia. Clinical Impression     1. Chest pain, unspecified type    2. Shortness of breath        Disposition     PATIENT REFERRED TO:  No follow-up provider specified.     DISCHARGE MEDICATIONS:  New Prescriptions    No medications on file       DISPOSITION Decision To Admit 04/22/2021 07:46:22 PM     Joy Fish MD  04/22/21 [id number]

## 2021-04-22 NOTE — ED PROVIDER NOTES
ED Attending Attestation Note     Date of evaluation: 4/22/2021    This patient was seen by the resident. I have seen and examined the patient, agree with the workup, evaluation, management and diagnosis. The care plan has been discussed. I have reviewed the ECG and concur with the resident's interpretation. My assessment reveals presents with shortness of breath. . Patient's had a previous history of DVT and PEs. In Feb. patient started noticing leg pain and chest pain that started last evening. She states she has been compliant with her Eliquis  On exam patient in no acute distress and pulse ox 90% on room air. Chest clear and cardiovascular exam unremarkable.      Amye Mohs, MD  04/22/21 [id number]

## 2021-04-22 NOTE — ED NOTES
Bed: A02-02  Expected date:   Expected time:   Means of arrival:   Comments:  1541 Mikey Mora RN  04/22/21 5610

## 2021-04-23 VITALS
BODY MASS INDEX: 33.92 KG/M2 | HEIGHT: 69 IN | WEIGHT: 229 LBS | SYSTOLIC BLOOD PRESSURE: 145 MMHG | RESPIRATION RATE: 13 BRPM | TEMPERATURE: 98.3 F | HEART RATE: 84 BPM | DIASTOLIC BLOOD PRESSURE: 64 MMHG | OXYGEN SATURATION: 99 %

## 2021-04-23 LAB
LV EF: 77 %
LVEF MODALITY: NORMAL
TROPONIN: <0.01 NG/ML

## 2021-04-23 PROCEDURE — A9502 TC99M TETROFOSMIN: HCPCS | Performed by: PHYSICIAN ASSISTANT

## 2021-04-23 PROCEDURE — 3430000000 HC RX DIAGNOSTIC RADIOPHARMACEUTICAL: Performed by: PHYSICIAN ASSISTANT

## 2021-04-23 PROCEDURE — 93971 EXTREMITY STUDY: CPT

## 2021-04-23 PROCEDURE — 2580000003 HC RX 258: Performed by: PHYSICIAN ASSISTANT

## 2021-04-23 PROCEDURE — 93017 CV STRESS TEST TRACING ONLY: CPT

## 2021-04-23 PROCEDURE — 6370000000 HC RX 637 (ALT 250 FOR IP): Performed by: PHYSICIAN ASSISTANT

## 2021-04-23 PROCEDURE — 78452 HT MUSCLE IMAGE SPECT MULT: CPT

## 2021-04-23 PROCEDURE — 84484 ASSAY OF TROPONIN QUANT: CPT

## 2021-04-23 RX ADMIN — Medication 10 ML: at 09:14

## 2021-04-23 RX ADMIN — TETROFOSMIN 35.9 MILLICURIE: 1.38 INJECTION, POWDER, LYOPHILIZED, FOR SOLUTION INTRAVENOUS at 09:13

## 2021-04-23 RX ADMIN — Medication 10 ML: at 07:35

## 2021-04-23 RX ADMIN — APIXABAN 5 MG: 5 TABLET, FILM COATED ORAL at 11:21

## 2021-04-23 RX ADMIN — ASPIRIN 81 MG: 81 TABLET, CHEWABLE ORAL at 11:20

## 2021-04-23 RX ADMIN — APIXABAN 5 MG: 5 TABLET, FILM COATED ORAL at 00:42

## 2021-04-23 RX ADMIN — Medication 10 ML: at 02:44

## 2021-04-23 RX ADMIN — TETROFOSMIN 11.5 MILLICURIE: 1.38 INJECTION, POWDER, LYOPHILIZED, FOR SOLUTION INTRAVENOUS at 07:35

## 2021-04-23 NOTE — ED PROVIDER NOTES
[facility] Emergency Department  EDObservation Admission Note   Emergency Physicians       Time Placed in ED Observation: DISPOSITION Ed Observation 04/22/2021 08:37:44 PM    Impression and Plan      In summary, Tiarra Mejia is admitted by to the [facility] for chest pain. Dr. Marysol Sequeira is the CDU admission attending. This patient has been risk-stratified based on the available history, physical exam, and related study findings. Admission toobservation status for further diagnosis/treatment/monitoring of chest pain is warranted clinically. This extendedperiod of observation is specifically required to determine the need for hospitalization. We will observe the patient for the following endpoints:    -Completion and results of GXT stress testing with nuclear medicine imaging  -Monitoring for changes of patient's troponin throughout the ED visit    When met, appropriate disposition will be arranged. Diagnostic Evaluation      Diagnostic studies and ED interventions germane to this period of clinical observation willinclude:    - GXT stress testing with nuclear medicine imaging  - Serial troponins  - Venous duplex ultrasound of the right lower extremity       Consultant(s)      IP CONSULT TO HOSPITALIST       Patient History      Tiarra Mejia is a 63 y.o. female who presented to the Emergency Department for evaluation of chest pain. Patient presented to the emergency department with substernal chest pain, pressure-like in quality that is continued since yesterday. History of PE and DVT. CTPA performed in the emergency department without evidence of pulmonary embolism or other acute intrathoracic abnormality. Her heart score equals 4. does report some right lower extremity pain without swelling or trauma. The acute evaluation included  CTA PULMONARY W CONTRAST   Final Result      No evidence of pulmonary embolus.       No acute pulmonary pathology XR CHEST (2 VW)   Final Result      No acute pulmonary pathology or change from the prior study     Labs Reviewed   BASIC METABOLIC PANEL W/ REFLEX TO MG FOR LOW K - Abnormal; Notable for the following components:       Result Value    Sodium 134 (*)     Chloride 97 (*)     Glucose 252 (*)     All other components within normal limits    Narrative:     Performed at: [facility]  [address] [facility] [address]   Phone [phone]   PROTIME-INR - Abnormal; Notable for the following components:    Protime 15.1 (*)     INR 1.30 (*)     All other components within normal limits    Narrative:     Performed at: [facility]  [address] [address] [facility] [address]   Phone [phone]   POCT GLUCOSE - Abnormal; Notable for the following components:    POC Glucose 230 (*)     All other components within normal limits    Narrative:     Performed at: [facility]  [address] [address]   Phone [phone]   POCT GLUCOSE - Abnormal; Notable for the following components:    POC Glucose 361 (*)     All other components within normal limits    Narrative:     Performed at: [facility]  [address], [address] [address] [facility] [address]   Phone [phone]   CBC WITH AUTO DIFFERENTIAL    Narrative:     Performed at: [facility]  [address] [address]   Phone [phone]   TROPONIN    Narrative:     Performed at: [facility]  [address] [facility] [address]   Phone [phone]   BRAIN NATRIURETIC PEPTIDE    Narrative:     Performed at: [facility]  [address], [address] [address]   Phone [phone]   TROPONIN    Narrative:     Performed at:   [facility] [employer] - 20 GM/200ML SOLN SOLUTION    Infuse 30 g intravenously every 30 days Receives every 28 days    INSULIN GLARGINE (LANTUS;BASAGLAR) 100 UNIT/ML INJECTION PEN    Inject 24 Units into the skin nightly    INSULIN LISPRO, 1 UNIT DIAL, 100 UNIT/ML SOPN    Inject 5 Units into the skin 3 times daily (with meals)    INSULIN PEN NEEDLE ([employer] PEN NEEDLES 29G) 29G X 12MM MISC    1 box by Does not apply route daily    LANCETS MISC    1 each by Does not apply route daily    LINAGLIPTIN-METFORMIN HCL (JENTADUETO PO)    Take by mouth    TRIAMTERENE-HYDROCHLOROTHIAZIDE (MAXZIDE-25) 37.5-25 MG PER TABLET    Take 1 tablet by mouth daily    VITAMIN D3 (CHOLECALCIFEROL) 125 MCG (5000 UT) TABS TABLET    Take 1 tablet by mouth daily          Review of Systems      Review of Systems   Constitutional: Negative for chills, diaphoresis, fever and unexpected weight change. HENT: Negative for congestion, drooling, mouth sores, postnasal drip, rhinorrhea, sinus pressure and sore throat. Eyes: Negative for pain, redness and itching. Respiratory: Negative for cough, chest tightness, shortness of breath and wheezing. Cardiovascular: Positive for chest pain and leg swelling. Negative for palpitations. Gastrointestinal: Negative for abdominal distention, abdominal pain, diarrhea, nausea and vomiting. Genitourinary: Negative for dysuria and hematuria. Musculoskeletal: Negative for arthralgias, back pain, gait problem, myalgias, neck pain and neck stiffness. Skin: Negative for color change, pallor and rash. Neurological: Negative for dizziness, seizures, syncope, weakness, light-headedness, numbness and headaches. Hematological: Does not bruise/bleed easily. Psychiatric/Behavioral: Negative for agitation, hallucinations, self-injury, sleep disturbance and suicidal ideas. The patient is not nervous/anxious. All other systems reviewed and are negative.        Physical Examination      Physical Exam  Vitals signs and nursing note reviewed. Constitutional:       General: She is not in acute distress. Appearance: She is well-developed. She is not diaphoretic. HENT:      Head: Normocephalic and atraumatic. Eyes:      General: No scleral icterus. Conjunctiva/sclera: Conjunctivae normal.      Pupils: Pupils are equal, round, and reactive to light. Neck:      Musculoskeletal: Normal range of motion and neck supple. Vascular: No JVD. Cardiovascular:      Rate and Rhythm: Normal rate and regular rhythm. Heart sounds: Normal heart sounds. Pulmonary:      Effort: Pulmonary effort is normal. No respiratory distress. Breath sounds: Normal breath sounds. No stridor. No wheezing or rales. Chest:      Chest wall: No tenderness. Abdominal:      General: There is no distension. Palpations: Abdomen is soft. Tenderness: There is no abdominal tenderness. Musculoskeletal: Normal range of motion. General: No tenderness. Skin:     General: Skin is warm and dry. Coloration: Skin is not pale. Findings: No rash. Neurological:      Mental Status: She is alert and oriented to person, place, and time.    Psychiatric:         Behavior: Behavior normal.            [facility] Vasquez PA  04/30/21 [address]  04/30/21 5392

## 2021-04-23 NOTE — ED PROVIDER NOTES
[facility] Emergency Department  ED Observation Disposition Note   Emergency Physicians         Diagnostic Evaluation      Diagnostic studies germane to this period of clinical observation include:    - Stress testing  - Serial troponins  - DVT study     Consultant(s) Final Recommendations      -Discharge home     Impression and Plan      Drew Mackenzie has been cared for according to the standard [facility] Delar[facility] observation protocol for chest pain/ACS rule out. This extended period of observation was specifically requiredto determine the need for hospitalization. Prior to discharge from observation, the final physical exam is documented above. Significant events during the course of observation based on the goals of the clinicalproblem list include:    -Normal stress test  -Normal negative troponins  -Improving DVT study from prior    Based on the patient's condition and test results, the plan is to Discharge to home    Thetotal length of observation was 19.5 hours. Dr. Pattie Davis is the CDU disposition attending. The patient will follow-up with:    -Primary care doctor    As appropriate, please see the AVS for comprehensive discharge instructions. Roe Prado hasundergone comprehensive diagnostic evaluation and therapeutic management in accordance with the CDU guidelines for chest pain/ACS rule out. Based on her clinical response and diagnostic information obtained during this period of observation, the disposition is Discharge to home     Physical Examination      Physical Exam  Constitutional:       Appearance: She is well-developed. HENT:      Head: Normocephalic and atraumatic. Neck:      Musculoskeletal: Normal range of motion and neck supple. Cardiovascular:      Rate and Rhythm: Normal rate and regular rhythm. Pulmonary:      Effort: Pulmonary effort is normal.   Musculoskeletal: Normal range of motion.    Skin:     General: Skin is warm

## 2021-04-30 ASSESSMENT — ENCOUNTER SYMPTOMS
ABDOMINAL DISTENTION: 0
RHINORRHEA: 0
COUGH: 0
NAUSEA: 0
SHORTNESS OF BREATH: 0
SINUS PRESSURE: 0
ABDOMINAL PAIN: 0
EYE REDNESS: 0
BACK PAIN: 0
DIARRHEA: 0
CHEST TIGHTNESS: 0
VOMITING: 0
WHEEZING: 0
EYE ITCHING: 0
COLOR CHANGE: 0
SORE THROAT: 0
EYE PAIN: 0

## 2021-08-27 ENCOUNTER — HOSPITAL ENCOUNTER (OUTPATIENT)
Dept: MAMMOGRAPHY | Age: 61
Discharge: HOME | End: 2021-08-27
Payer: COMMERCIAL

## 2021-08-27 VITALS — WEIGHT: 230 LBS | BODY MASS INDEX: 34.07 KG/M2 | HEIGHT: 69 IN

## 2021-08-27 DIAGNOSIS — Z12.31 VISIT FOR SCREENING MAMMOGRAM: ICD-10-CM

## 2021-08-27 PROCEDURE — 77063 BREAST TOMOSYNTHESIS BI: CPT

## 2022-09-29 ENCOUNTER — HOSPITAL ENCOUNTER (OUTPATIENT)
Dept: VASCULAR LAB | Age: 62
Discharge: HOME | End: 2022-09-29
Payer: COMMERCIAL

## 2022-09-29 DIAGNOSIS — Z86.718 PERSONAL HISTORY OF VENOUS THROMBOSIS AND EMBOLISM: ICD-10-CM

## 2022-09-29 DIAGNOSIS — M79.604 BILATERAL LEG PAIN: ICD-10-CM

## 2022-09-29 DIAGNOSIS — M79.605 BILATERAL LEG PAIN: ICD-10-CM

## 2022-09-29 PROCEDURE — 93970 EXTREMITY STUDY: CPT

## 2022-10-18 ENCOUNTER — HOSPITAL ENCOUNTER (OUTPATIENT)
Dept: MAMMOGRAPHY | Age: 62
Discharge: HOME | End: 2022-10-18
Payer: COMMERCIAL

## 2022-10-18 VITALS — BODY MASS INDEX: 33.33 KG/M2 | WEIGHT: 225 LBS | HEIGHT: 69 IN

## 2022-10-18 DIAGNOSIS — Z12.31 VISIT FOR SCREENING MAMMOGRAM: ICD-10-CM

## 2022-10-18 PROCEDURE — 77063 BREAST TOMOSYNTHESIS BI: CPT

## 2023-12-08 ENCOUNTER — HOSPITAL ENCOUNTER (OUTPATIENT)
Dept: MAMMOGRAPHY | Age: 63
Discharge: HOME | End: 2023-12-08
Payer: COMMERCIAL

## 2023-12-08 VITALS — WEIGHT: 217 LBS | BODY MASS INDEX: 32.14 KG/M2 | HEIGHT: 69 IN

## 2023-12-08 DIAGNOSIS — Z12.31 VISIT FOR SCREENING MAMMOGRAM: ICD-10-CM

## 2023-12-08 PROCEDURE — 77063 BREAST TOMOSYNTHESIS BI: CPT

## 2023-12-30 NOTE — DISCHARGE SUMMARY
INTERNAL MEDICINE    RESIDENT DISCHARGE SUMMARY   Discharge Summaries      Patient ID: Davida Walsh   Gender: female      :  1960  AGE: 63 y.o. MRN:  [id number]  Code Status: Full Code   PCP: Eris Chandler    Admit date:  2021      Discharge date:  No discharge date for patient encounter. Admitting Physician:  Evan Esquivel MD    Discharge Physician: Romelia Johnson DO    Admission Condition:  fair  Discharged Condition:  good Stable    Discharge Diagnoses:    Acute Pulmonary Embolism   DVT  DM T2  Hyperglycemia  Obesity       Active Hospital Problems    Diagnosis Date Noted    Acute pulmonary embolism without acute cor pulmonale ([facility] [address] [I26.99] 2021       The patient was seen and examined on day of discharge and this discharge summary is in conjunction with any daily progress note from day of discharge. Hospital Course:     Pt is a 62yo F with PMH of DMT2, HTN, obesity, psoriatic arthritis, IgG deficiency, depression and recent COVID infection presented to [facility] ER with R calf cramping pain and pleuritic chest pain starting same morning. In ER, Pt vitals were stable. Lab showed elevated D-dimers and CTPA showed acute bilateral pulmonary embolisms without R heart strain. Pt was started on a heparin drip and admitted to the general medicine wards for further management. Subsequent lower extremity doppler US showed R leg acute, partially to totally occluding deep venous thrombosis involving the femoral vein in the distal thigh, popliteal vein, posterior tibial, peroneal and soleal veins. Cardiac echo did not reveal evidence of pulmonary hypertension or R heart strain. Patient was transitioned from heparin gtt to eliquis 10mg bid. Patient received first dose of eliquis prior to discharge. Will continue eliquis 10 mg bid for 7 days and patient knows to follow up with PCP for the remainder of eliquis prescription. Patient will also be discharged with insulin.  RN Bilateral         CT ABDOMEN PELVIS W WO CONTRAST Additional Contrast? Oral   Final Result      Mild hepatic steatosis. Colonic diverticulosis. No acute abnormality identified. CT CHEST PULMONARY EMBOLISM W CONTRAST   Final Result      1. Bilateral pulmonary thromboembolus, in the branch vessels proximal mid and distal without signs of any pleural effusion, pulmonary infarct or right heart strain at this time. No parenchymal consolidation      2. No sign of any aortic aneurysm or dissection. Fatty liver and hepatic cystic change noted      Results called to the emergency room resident physician taking care of this patient at 2025 hours      XR CHEST PORTABLE   Final Result      1. No acute process or consolidation   2. Stable                      Consults:     IP CONSULT TO HOSPITALIST  IP CONSULT TO HEMATOLOGY      Discharge Instructions/Follow-up:      Pt advised to follow-up with PCP within 1-2 weeks post-discharge to continue care. Activity: activity as tolerated    Diet: regular diet    Discharge Medications:     Current Discharge Medication List           Details   apixaban (ELIQUIS) 5 MG TABS tablet Take 2 tablets by mouth 2 times daily for 7 days  Qty: 28 tablet, Refills: 0      insulin glargine (LANTUS;BASAGLAR) 100 UNIT/ML injection pen Inject 24 Units into the skin nightly  Qty: 5 pen, Refills: 3      insulin lispro, 1 Unit Dial, 100 UNIT/ML SOPN Inject 5 Units into the skin 3 times daily (with meals)  Qty: 1 pen, Refills: 0      glucose monitoring kit (FREESTYLE) monitoring kit 1 kit by Does not apply route daily  Qty: 1 kit, Refills: 0      Lancets MISC 1 each by Does not apply route daily  Qty: 100 each, Refills: 5      blood glucose monitor strips Test 3 times a day & as needed for symptoms of irregular blood glucose. Dispense sufficient amount for indicated testing frequency plus additional to accommodate PRN testing needs.   Qty: 90 strip, Refills: 0    Comments: Brand per patient preference. May round up to next available package size. Details   triamterene-hydroCHLOROthiazide (MAXZIDE-25) 37.5-25 MG per tablet Take 1 tablet by mouth daily      Apremilast (OTEZLA PO) Take by mouth      immune globulin, human, (GAMMAPLEX) 20 GM/200ML SOLN solution Infuse 30 g intravenously every 30 days Receives every 28 days      DULoxetine (CYMBALTA) 30 MG extended release capsule Take 30 mg by mouth daily      vitamin D3 (CHOLECALCIFEROL) 125 MCG (5000 UT) TABS tablet Take 1 tablet by mouth daily      linaGLIPtin-metFORMIN HCl (JENTADUETO PO) Take by mouth             Time Spent on discharge is more than 30 minutes in the examination, evaluation, counseling and review of medications and discharge plan. Signed:    Mary Pinedo DO   Internal Medicine Resident, PGY-1  2/8/2021    Priscilla Orosco MS4 as scribe    Thank you Lucy Ley for the opportunity to be involved in this patient's care. If you have any questions or concerns please feel free to contact me. not formally assessed

## 2024-12-09 ENCOUNTER — HOSPITAL ENCOUNTER (OUTPATIENT)
Dept: MAMMOGRAPHY | Age: 64
Discharge: HOME | End: 2024-12-09
Payer: COMMERCIAL

## 2024-12-09 VITALS — HEIGHT: 69 IN | WEIGHT: 207 LBS | BODY MASS INDEX: 30.66 KG/M2

## 2024-12-09 DIAGNOSIS — Z12.31 VISIT FOR SCREENING MAMMOGRAM: ICD-10-CM

## 2024-12-09 PROCEDURE — 77063 BREAST TOMOSYNTHESIS BI: CPT
